# Patient Record
Sex: FEMALE | Race: WHITE | NOT HISPANIC OR LATINO | Employment: FULL TIME | URBAN - METROPOLITAN AREA
[De-identification: names, ages, dates, MRNs, and addresses within clinical notes are randomized per-mention and may not be internally consistent; named-entity substitution may affect disease eponyms.]

---

## 2017-01-02 DIAGNOSIS — N95.1 MENOPAUSAL SYNDROME: ICD-10-CM

## 2017-01-03 RX ORDER — SUMATRIPTAN SUCCINATE 100 MG/1
TABLET ORAL
Qty: 6 TABLET | Refills: 0 | OUTPATIENT
Start: 2017-01-03

## 2017-01-05 ENCOUNTER — PATIENT MESSAGE (OUTPATIENT)
Dept: OBSTETRICS AND GYNECOLOGY | Facility: CLINIC | Age: 62
End: 2017-01-05

## 2017-01-05 DIAGNOSIS — N95.1 MENOPAUSAL SYNDROME: ICD-10-CM

## 2017-01-05 RX ORDER — SUMATRIPTAN SUCCINATE 100 MG/1
TABLET ORAL
Qty: 6 TABLET | Refills: 3 | Status: SHIPPED | OUTPATIENT
Start: 2017-01-05 | End: 2019-08-29

## 2017-01-07 DIAGNOSIS — N95.1 MENOPAUSAL SYNDROME: ICD-10-CM

## 2017-01-08 RX ORDER — SUMATRIPTAN SUCCINATE 100 MG/1
TABLET ORAL
Qty: 6 TABLET | Refills: 0 | Status: SHIPPED | OUTPATIENT
Start: 2017-01-08 | End: 2017-04-07 | Stop reason: SDUPTHER

## 2017-01-09 DIAGNOSIS — B35.1 ONYCHOMYCOSIS: ICD-10-CM

## 2017-01-10 RX ORDER — TERBINAFINE HYDROCHLORIDE 250 MG/1
TABLET ORAL
Qty: 30 TABLET | Refills: 0 | Status: SHIPPED | OUTPATIENT
Start: 2017-01-10 | End: 2017-02-16 | Stop reason: SDUPTHER

## 2017-01-28 DIAGNOSIS — N95.1 MENOPAUSAL SYNDROME: ICD-10-CM

## 2017-01-30 RX ORDER — SULFAMETHOXAZOLE AND TRIMETHOPRIM 800; 160 MG/1; MG/1
TABLET ORAL
Qty: 90 TABLET | Refills: 1 | Status: SHIPPED | OUTPATIENT
Start: 2017-01-30 | End: 2017-03-20 | Stop reason: SDUPTHER

## 2017-02-16 DIAGNOSIS — B35.1 ONYCHOMYCOSIS: ICD-10-CM

## 2017-02-20 RX ORDER — TERBINAFINE HYDROCHLORIDE 250 MG/1
TABLET ORAL
Qty: 30 TABLET | Refills: 0 | Status: SHIPPED | OUTPATIENT
Start: 2017-02-20 | End: 2017-10-29 | Stop reason: SDUPTHER

## 2017-03-20 DIAGNOSIS — N95.1 MENOPAUSAL SYNDROME: ICD-10-CM

## 2017-03-20 RX ORDER — THYROID 60 MG/1
60 TABLET ORAL DAILY
Qty: 90 TABLET | Refills: 1 | Status: SHIPPED | OUTPATIENT
Start: 2017-03-20 | End: 2018-04-02 | Stop reason: DRUGHIGH

## 2017-03-20 RX ORDER — ESTRADIOL 0.1 MG/D
1 FILM, EXTENDED RELEASE TRANSDERMAL
Qty: 8 PATCH | Refills: 4 | Status: SHIPPED | OUTPATIENT
Start: 2017-03-20 | End: 2018-03-20

## 2017-03-20 RX ORDER — PROGESTERONE 100 MG/1
100 CAPSULE ORAL DAILY
Qty: 90 CAPSULE | Refills: 1 | Status: SHIPPED | OUTPATIENT
Start: 2017-03-20 | End: 2018-04-02

## 2017-04-07 DIAGNOSIS — N95.1 MENOPAUSAL SYNDROME: ICD-10-CM

## 2017-04-07 RX ORDER — SUMATRIPTAN SUCCINATE 100 MG/1
TABLET ORAL
Qty: 6 TABLET | Refills: 0 | Status: SHIPPED | OUTPATIENT
Start: 2017-04-07 | End: 2017-08-10 | Stop reason: SDUPTHER

## 2017-06-28 ENCOUNTER — PATIENT MESSAGE (OUTPATIENT)
Dept: CARDIOLOGY | Facility: CLINIC | Age: 62
End: 2017-06-28

## 2017-06-28 ENCOUNTER — OFFICE VISIT (OUTPATIENT)
Dept: CARDIOLOGY | Facility: CLINIC | Age: 62
End: 2017-06-28
Payer: COMMERCIAL

## 2017-06-28 VITALS
HEIGHT: 63 IN | HEART RATE: 84 BPM | SYSTOLIC BLOOD PRESSURE: 143 MMHG | BODY MASS INDEX: 29.49 KG/M2 | WEIGHT: 166.44 LBS | DIASTOLIC BLOOD PRESSURE: 102 MMHG

## 2017-06-28 DIAGNOSIS — R00.2 PALPITATIONS: ICD-10-CM

## 2017-06-28 DIAGNOSIS — E78.5 HYPERLIPIDEMIA, UNSPECIFIED HYPERLIPIDEMIA TYPE: ICD-10-CM

## 2017-06-28 DIAGNOSIS — I34.1 MVP (MITRAL VALVE PROLAPSE): ICD-10-CM

## 2017-06-28 DIAGNOSIS — E03.9 HYPOTHYROIDISM, UNSPECIFIED TYPE: ICD-10-CM

## 2017-06-28 PROCEDURE — 93000 ELECTROCARDIOGRAM COMPLETE: CPT | Mod: S$GLB,,, | Performed by: INTERNAL MEDICINE

## 2017-06-28 PROCEDURE — 99204 OFFICE O/P NEW MOD 45 MIN: CPT | Mod: S$GLB,,, | Performed by: INTERNAL MEDICINE

## 2017-06-28 PROCEDURE — 99999 PR PBB SHADOW E&M-EST. PATIENT-LVL II: CPT | Mod: PBBFAC,,, | Performed by: INTERNAL MEDICINE

## 2017-06-28 NOTE — PROGRESS NOTES
Subjective:    Patient ID:  Fabricio Sawyer is a 62 y.o. female who presents for follow-up of Palpitations (check up); Mitral Valve Prolapse; and Hyperlipidemia      Pt with a previous diagnosis of MVP as a teen felt she should be checked. No other cardiac issues to report. She has occasional palpitations with too much caffeine and some ankle swelling with too much salt. She exercises regularly with walking 3 miles at a time w/o problem. She denies any dizziness, syncope or pre-syncope. She denies any chest pain, SOB, PND, orthopnea. She denies exertional symptoms.        Review of Systems   Constitution: Negative for weight gain and weight loss.   HENT: Negative.    Eyes: Negative.    Cardiovascular: Positive for palpitations (occasional). Negative for chest pain, claudication, cyanosis, dyspnea on exertion, irregular heartbeat, leg swelling, near-syncope, orthopnea (no PND) and syncope.   Respiratory: Negative for cough, hemoptysis, shortness of breath and snoring.    Endocrine: Negative.    Skin: Negative.    Musculoskeletal: Negative for joint pain, muscle cramps, muscle weakness and myalgias.   Gastrointestinal: Negative for diarrhea, hematemesis, nausea and vomiting.   Genitourinary: Negative.    Neurological: Negative for dizziness, focal weakness, light-headedness, loss of balance, numbness, paresthesias and seizures.   Psychiatric/Behavioral: Negative.         Objective:    Physical Exam   Constitutional: She is oriented to person, place, and time. She appears well-developed and well-nourished.   Eyes: Pupils are equal, round, and reactive to light.   Neck: Normal range of motion. No thyromegaly present.   Cardiovascular: Normal rate, regular rhythm, S1 normal, S2 normal, normal heart sounds, intact distal pulses and normal pulses.   No extrasystoles are present. PMI is not displaced.  Exam reveals no friction rub.    No murmur heard.  Pulmonary/Chest: Effort normal and breath sounds normal. She has no  wheezes. She has no rales. She exhibits no tenderness.   Abdominal: Soft. Bowel sounds are normal. She exhibits no distension and no mass. There is no tenderness.   Musculoskeletal: Normal range of motion. She exhibits no edema.   Neurological: She is alert and oriented to person, place, and time.   Skin: Skin is warm and dry.   Vitals reviewed.      Test(s) Reviewed  I have reviewed the following in detail:  [x] Stress test   [] Angiography   [x] Echocardiogram   [x] Labs   [x] Other:  ECG - NSR; PVC; O/W nml       Assessment:       1. Hyperlipidemia, unspecified hyperlipidemia type    2. Hypothyroidism, unspecified type    3. MVP (mitral valve prolapse)         Plan:       We discussed diet, continued Exercise, her lipids and weight loss  Echo  She will try diet for a few months - she did not feel well previously on statins

## 2017-06-29 DIAGNOSIS — R00.2 PALPITATIONS: Primary | ICD-10-CM

## 2017-07-13 ENCOUNTER — CLINICAL SUPPORT (OUTPATIENT)
Dept: CARDIOLOGY | Facility: CLINIC | Age: 62
End: 2017-07-13
Payer: COMMERCIAL

## 2017-07-13 DIAGNOSIS — E03.9 HYPOTHYROIDISM, UNSPECIFIED TYPE: ICD-10-CM

## 2017-07-13 DIAGNOSIS — E78.5 HYPERLIPIDEMIA, UNSPECIFIED HYPERLIPIDEMIA TYPE: ICD-10-CM

## 2017-07-13 DIAGNOSIS — I34.0 NONRHEUMATIC MITRAL VALVE INSUFFICIENCY: ICD-10-CM

## 2017-07-13 DIAGNOSIS — I34.1 MVP (MITRAL VALVE PROLAPSE): ICD-10-CM

## 2017-07-13 PROCEDURE — 93306 TTE W/DOPPLER COMPLETE: CPT | Mod: S$GLB,,, | Performed by: INTERNAL MEDICINE

## 2017-07-14 LAB
DIASTOLIC DYSFUNCTION: NO
ESTIMATED PA SYSTOLIC PRESSURE: 29.21
MITRAL VALVE MOBILITY: NORMAL
MITRAL VALVE REGURGITATION: NORMAL
RETIRED EF AND QEF - SEE NOTES: 55 (ref 55–65)
TRICUSPID VALVE REGURGITATION: NORMAL

## 2017-07-18 ENCOUNTER — TELEPHONE (OUTPATIENT)
Dept: CARDIOLOGY | Facility: CLINIC | Age: 62
End: 2017-07-18

## 2017-07-18 NOTE — TELEPHONE ENCOUNTER
Test(s) Reviewed  I have reviewed the following in detail:  [] Stress test   [] Angiography   [x] Echocardiogram   [] Labs   [] Other:       Call Pt and tell her tests are ok  Mild mitral valve leak - nothing to do  Can f/u with repeat Echo in 1 year

## 2017-07-19 ENCOUNTER — PATIENT MESSAGE (OUTPATIENT)
Dept: CARDIOLOGY | Facility: CLINIC | Age: 62
End: 2017-07-19

## 2017-07-20 ENCOUNTER — PATIENT MESSAGE (OUTPATIENT)
Dept: CARDIOLOGY | Facility: CLINIC | Age: 62
End: 2017-07-20

## 2017-08-10 DIAGNOSIS — N95.1 MENOPAUSAL SYNDROME: ICD-10-CM

## 2017-08-11 RX ORDER — SUMATRIPTAN SUCCINATE 100 MG/1
TABLET ORAL
Qty: 6 TABLET | Refills: 0 | Status: SHIPPED | OUTPATIENT
Start: 2017-08-11 | End: 2017-10-01 | Stop reason: SDUPTHER

## 2017-10-01 DIAGNOSIS — N95.1 MENOPAUSAL SYNDROME: ICD-10-CM

## 2017-10-02 RX ORDER — SUMATRIPTAN SUCCINATE 100 MG/1
TABLET ORAL
Qty: 6 TABLET | Refills: 0 | Status: SHIPPED | OUTPATIENT
Start: 2017-10-02 | End: 2017-11-28 | Stop reason: SDUPTHER

## 2017-10-28 DIAGNOSIS — B35.1 ONYCHOMYCOSIS: ICD-10-CM

## 2017-10-29 RX ORDER — TERBINAFINE HYDROCHLORIDE 250 MG/1
TABLET ORAL
Qty: 30 TABLET | Refills: 0 | Status: SHIPPED | OUTPATIENT
Start: 2017-10-29 | End: 2017-11-30 | Stop reason: SDUPTHER

## 2017-11-28 DIAGNOSIS — N95.1 MENOPAUSAL SYNDROME: ICD-10-CM

## 2017-11-28 RX ORDER — SUMATRIPTAN SUCCINATE 100 MG/1
TABLET ORAL
Qty: 6 TABLET | Refills: 0 | Status: SHIPPED | OUTPATIENT
Start: 2017-11-28 | End: 2018-01-21 | Stop reason: SDUPTHER

## 2017-11-29 ENCOUNTER — OFFICE VISIT (OUTPATIENT)
Dept: FAMILY MEDICINE | Facility: CLINIC | Age: 62
End: 2017-11-29
Payer: COMMERCIAL

## 2017-11-29 VITALS
SYSTOLIC BLOOD PRESSURE: 140 MMHG | WEIGHT: 167.56 LBS | RESPIRATION RATE: 18 BRPM | HEART RATE: 95 BPM | DIASTOLIC BLOOD PRESSURE: 100 MMHG | HEIGHT: 63 IN | BODY MASS INDEX: 29.69 KG/M2

## 2017-11-29 DIAGNOSIS — G43.809 OTHER MIGRAINE WITHOUT STATUS MIGRAINOSUS, NOT INTRACTABLE: ICD-10-CM

## 2017-11-29 DIAGNOSIS — B35.1 ONYCHOMYCOSIS: ICD-10-CM

## 2017-11-29 DIAGNOSIS — F33.1 MODERATE EPISODE OF RECURRENT MAJOR DEPRESSIVE DISORDER: Primary | ICD-10-CM

## 2017-11-29 PROCEDURE — 96372 THER/PROPH/DIAG INJ SC/IM: CPT | Mod: S$GLB,,, | Performed by: FAMILY MEDICINE

## 2017-11-29 PROCEDURE — 99214 OFFICE O/P EST MOD 30 MIN: CPT | Mod: 25,S$GLB,, | Performed by: PHYSICIAN ASSISTANT

## 2017-11-29 PROCEDURE — 99999 PR PBB SHADOW E&M-EST. PATIENT-LVL III: CPT | Mod: PBBFAC,,, | Performed by: PHYSICIAN ASSISTANT

## 2017-11-29 RX ORDER — KETOROLAC TROMETHAMINE 30 MG/ML
60 INJECTION, SOLUTION INTRAMUSCULAR; INTRAVENOUS
Status: COMPLETED | OUTPATIENT
Start: 2017-11-29 | End: 2017-11-29

## 2017-11-29 RX ORDER — BUPROPION HYDROCHLORIDE 100 MG/1
TABLET, EXTENDED RELEASE ORAL
Qty: 60 TABLET | Refills: 1 | Status: SHIPPED | OUTPATIENT
Start: 2017-11-29 | End: 2018-01-28 | Stop reason: SDUPTHER

## 2017-11-29 RX ORDER — THYROID, PORCINE 90 MG/1
120 TABLET ORAL
Refills: 0 | COMMUNITY
Start: 2017-10-01 | End: 2020-09-02 | Stop reason: CLARIF

## 2017-11-29 RX ORDER — NALTREXONE HYDROCHLORIDE AND BUPROPION HYDROCHLORIDE 8; 90 MG/1; MG/1
TABLET, EXTENDED RELEASE ORAL
Refills: 5 | COMMUNITY
Start: 2017-11-15 | End: 2018-04-02 | Stop reason: SINTOL

## 2017-11-29 RX ORDER — PROGESTERONE 200 MG/1
200 CAPSULE ORAL NIGHTLY
Refills: 1 | COMMUNITY
Start: 2017-09-23

## 2017-11-29 RX ADMIN — KETOROLAC TROMETHAMINE 60 MG: 30 INJECTION, SOLUTION INTRAMUSCULAR; INTRAVENOUS at 10:11

## 2017-11-29 NOTE — PROGRESS NOTES
Subjective:       Patient ID: Fabricio Sawyer is a 62 y.o. female    Chief Complaint: Migraines (Migraine headaches since Monday.Patient states that Contrave (been taking it for 2 weeks)might be causing symptoms.patient very emotional.Crying.Also some running nose for 6 months)    HPI  Mrs Sawyer is new to me.  She presents today CO depression, irritability, crying and migraines for the past 3 days that have kept her from working.  She was started on Contrave by her gynecologist 2 weeks ago and she has tapered up to taking one tablet in the morning and one at night.  She reports that she usually gets a migraine twice a month that resolves with Imitrex.  Her headaches have been the same as her usual headaches, she denies any increasing pain or new symptoms.  In 2012 she was prescribed Wellbutrin and tolerated it well.      Review of Systems   Constitutional: Negative for fatigue.   Cardiovascular: Negative for chest pain and palpitations.   Gastrointestinal: Negative for abdominal pain.   Musculoskeletal: Positive for myalgias.   Neurological: Positive for headaches.   Psychiatric/Behavioral: Positive for agitation and dysphoric mood. Negative for self-injury. The patient is nervous/anxious.         Objective:   Physical Exam   Constitutional: She is oriented to person, place, and time. She appears well-developed and well-nourished. No distress.   HENT:   Head: Normocephalic and atraumatic.   Eyes: EOM are normal. Pupils are equal, round, and reactive to light.   Neck: Normal range of motion. Neck supple.   Cardiovascular: Normal rate, regular rhythm, normal heart sounds and intact distal pulses.  Exam reveals no gallop and no friction rub.    No murmur heard.  Pulmonary/Chest: Effort normal and breath sounds normal. No respiratory distress. She has no wheezes. She has no rales. She exhibits no tenderness.   Abdominal: Soft. Bowel sounds are normal. She exhibits no distension and no mass. There is no tenderness.  There is no guarding.   Musculoskeletal: Normal range of motion.   Neurological: She is alert and oriented to person, place, and time.   Skin: Skin is warm and dry. No rash noted. She is not diaphoretic.   Psychiatric: Her behavior is normal. Judgment and thought content normal.   The patient is crying throughout the exam, she denies any SI or HI        Assessment:       1. Moderate episode of recurrent major depressive disorder  buPROPion (WELLBUTRIN SR) 100 MG TBSR 12 hr tablet   2. Other migraine without status migrainosus, not intractable  ketorolac injection 60 mg        Plan:       Moderate episode of recurrent major depressive disorder  -     buPROPion (WELLBUTRIN SR) 100 MG TBSR 12 hr tablet; Start with one table once daily X 1 week then increase to twice daily  Dispense: 60 tablet; Refill: 1  - wean off of Contrave per your pre scribers instructions    Other migraine without status migrainosus, not intractable  -     ketorolac injection 60 mg; Inject 2 mLs (60 mg total) into the muscle one time.

## 2017-11-29 NOTE — LETTER
November 29, 2017      [facility]  [address] [facility] [address] [place] [address]  Phone: [phone]  Fax: [phone]       Patient: Fabricio Sawyer   YOB: 1955  Date of Visit: 11/29/2017    To Whom It May Concern:    Jovanny Sawyer  was at [facility] on 11/29/2017. She may return to work/school on 12/04/2017 with no restrictions. If you have any questions or concerns, or if I can be of further assistance, please do not hesitate to contact me.    Sincerely,    Tania Beltran PA-C

## 2017-11-30 RX ORDER — TERBINAFINE HYDROCHLORIDE 250 MG/1
TABLET ORAL
Qty: 30 TABLET | Refills: 0 | Status: SHIPPED | OUTPATIENT
Start: 2017-11-30 | End: 2018-01-06 | Stop reason: SDUPTHER

## 2018-01-06 DIAGNOSIS — B35.1 ONYCHOMYCOSIS: ICD-10-CM

## 2018-01-08 RX ORDER — TERBINAFINE HYDROCHLORIDE 250 MG/1
TABLET ORAL
Qty: 30 TABLET | Refills: 0 | Status: SHIPPED | OUTPATIENT
Start: 2018-01-08 | End: 2018-02-11 | Stop reason: SDUPTHER

## 2018-01-21 DIAGNOSIS — N95.1 MENOPAUSAL SYNDROME: ICD-10-CM

## 2018-01-21 RX ORDER — SUMATRIPTAN SUCCINATE 100 MG/1
TABLET ORAL
Qty: 12 TABLET | Refills: 3 | Status: SHIPPED | OUTPATIENT
Start: 2018-01-21 | End: 2018-02-20

## 2018-01-28 DIAGNOSIS — F33.1 MODERATE EPISODE OF RECURRENT MAJOR DEPRESSIVE DISORDER: ICD-10-CM

## 2018-01-29 RX ORDER — BUPROPION HYDROCHLORIDE 100 MG/1
TABLET, EXTENDED RELEASE ORAL
Qty: 60 TABLET | Refills: 1 | Status: SHIPPED | OUTPATIENT
Start: 2018-01-29 | End: 2018-03-14 | Stop reason: SDUPTHER

## 2018-01-29 RX ORDER — ESTRADIOL 0.07 MG/D
1 FILM, EXTENDED RELEASE TRANSDERMAL
COMMUNITY
Start: 2017-12-06

## 2018-02-03 ENCOUNTER — PATIENT MESSAGE (OUTPATIENT)
Dept: PODIATRY | Facility: CLINIC | Age: 63
End: 2018-02-03

## 2018-02-10 DIAGNOSIS — B35.1 ONYCHOMYCOSIS: ICD-10-CM

## 2018-02-11 RX ORDER — TERBINAFINE HYDROCHLORIDE 250 MG/1
TABLET ORAL
Qty: 30 TABLET | Refills: 0 | Status: SHIPPED | OUTPATIENT
Start: 2018-02-11 | End: 2018-03-13 | Stop reason: SDUPTHER

## 2018-03-05 ENCOUNTER — TELEPHONE (OUTPATIENT)
Dept: FAMILY MEDICINE | Facility: CLINIC | Age: 63
End: 2018-03-05

## 2018-03-05 NOTE — TELEPHONE ENCOUNTER
----- Message from Samantha Quiñones sent at 3/5/2018  4:23 PM CST -----  Contact: Patient   X _1st Request  _  2nd Request  _  3rd Request    Who:BEBA DELGADO [[id number]]    Why:Patient is requesting to schedule an appointment for after 3:00pm for depression     What Number to Call Back:[phone]    When to Expect a call back: (Before the end of the day)   -- if call after 3:00 call back will be tomorrow.

## 2018-03-13 DIAGNOSIS — B35.1 ONYCHOMYCOSIS: ICD-10-CM

## 2018-03-13 RX ORDER — TERBINAFINE HYDROCHLORIDE 250 MG/1
TABLET ORAL
Qty: 30 TABLET | Refills: 0 | Status: SHIPPED | OUTPATIENT
Start: 2018-03-13 | End: 2018-04-08 | Stop reason: SDUPTHER

## 2018-03-14 DIAGNOSIS — F33.1 MODERATE EPISODE OF RECURRENT MAJOR DEPRESSIVE DISORDER: ICD-10-CM

## 2018-03-16 RX ORDER — BUPROPION HYDROCHLORIDE 100 MG/1
100 TABLET, EXTENDED RELEASE ORAL 2 TIMES DAILY
Qty: 60 TABLET | Refills: 5 | Status: SHIPPED | OUTPATIENT
Start: 2018-03-16 | End: 2018-04-02

## 2018-04-02 ENCOUNTER — OFFICE VISIT (OUTPATIENT)
Dept: FAMILY MEDICINE | Facility: CLINIC | Age: 63
End: 2018-04-02
Payer: COMMERCIAL

## 2018-04-02 VITALS
SYSTOLIC BLOOD PRESSURE: 124 MMHG | TEMPERATURE: 99 F | WEIGHT: 168.31 LBS | BODY MASS INDEX: 29.82 KG/M2 | DIASTOLIC BLOOD PRESSURE: 82 MMHG | OXYGEN SATURATION: 98 % | RESPIRATION RATE: 18 BRPM | HEIGHT: 63 IN | HEART RATE: 89 BPM

## 2018-04-02 DIAGNOSIS — F32.A DEPRESSION, UNSPECIFIED DEPRESSION TYPE: ICD-10-CM

## 2018-04-02 DIAGNOSIS — M54.2 CERVICALGIA: Primary | ICD-10-CM

## 2018-04-02 PROCEDURE — 99999 PR PBB SHADOW E&M-EST. PATIENT-LVL III: CPT | Mod: PBBFAC,,, | Performed by: FAMILY MEDICINE

## 2018-04-02 PROCEDURE — 99214 OFFICE O/P EST MOD 30 MIN: CPT | Mod: S$GLB,,, | Performed by: FAMILY MEDICINE

## 2018-04-02 RX ORDER — TIZANIDINE 2 MG/1
TABLET ORAL
Qty: 30 TABLET | Refills: 1 | Status: SHIPPED | OUTPATIENT
Start: 2018-04-02 | End: 2018-05-12 | Stop reason: SDUPTHER

## 2018-04-02 RX ORDER — BUPROPION HYDROCHLORIDE 300 MG/1
300 TABLET ORAL DAILY
Qty: 30 TABLET | Refills: 5 | Status: SHIPPED | OUTPATIENT
Start: 2018-04-02 | End: 2018-09-27 | Stop reason: SDUPTHER

## 2018-04-02 NOTE — PROGRESS NOTES
Subjective:     THIS DOCUMENT WAS MADE IN PART WITH [employer] DICTATION SOFTWARE. OCCASIONALLY THIS SOFTWARE MAY MISINTERPRET WORDS OR PHRASES.     Patient ID: Fabricio Sawyer is a 63 y.o. female.    Chief Complaint: Medication Problem (would like to discuss medication change of anti depressant)    HPI      Patient presents for medication follow-up and requested adjustments. I last saw her personally in 2014. She has a history of depression and anxiety and stress. Recently was placed on Wellbutrin. She said she recognize warning signs of depression, becoming isolated, anhedonia. Two nightmares but no SI. She is improved after beginning will be true but still not ideal.   When I last saw her four years ago she had similar response to will be different but not complete and was placed on Lexapro. This continuing with the Wellbutrin. She was lost to follow-up and she reports that she did well and after about a year, under guidance from her office she reports she queen down on both and did fine until recently.     She does complain of stress which causes muscle tightness  Tight in lower neck and upper back  Chiropractor, , marginal improvement.    Does take a sleep aid otc daily    Active Ambulatory Problems     Diagnosis Date Noted    Hyperlipidemia     Greater trochanteric bursitis 02/13/2012    Incontinence of urine- mild 03/26/2013    Hypothyroid 03/24/2014    Anxiety 09/12/2014    MVP (mitral valve prolapse) 06/28/2017     Resolved Ambulatory Problems     Diagnosis Date Noted    No Resolved Ambulatory Problems     Past Medical History:   Diagnosis Date    Abnormal Pap smear     Abnormal Pap smear of vagina     Breast disorder     Colon polyps     Hyperlipidemia     Hypothyroid 3/24/2014    MVP (mitral valve prolapse)          Review of Systems   Respiratory: Negative for chest tightness and shortness of breath.    Cardiovascular: Negative for chest pain.   Psychiatric/Behavioral: Positive for dysphoric  "mood and sleep disturbance. Negative for suicidal ideas. The patient is nervous/anxious.       As above  Objective:      Physical Exam    Vitals:    04/02/18 1625   BP: 124/82   BP Location: Left arm   Patient Position: Sitting   BP Method: Medium (Manual)   Pulse: 89   Resp: 18   Temp: 98.9 °F (37.2 °C)   TempSrc: Oral   SpO2: 98%   Weight: 76.4 kg (168 lb 5.1 oz)   Height: 5' 3" (1.6 m)         Assessment:       1. Cervicalgia    2. Depression, unspecified depression type        Plan:       Fabricio was seen today for medication problem.    Diagnoses and all orders for this visit:    Cervicalgia   she has some muscle tightness in the cervical, trapezius, and upper thoracic region. She feels it is exacerbated by stress. I will add a muscle relaxer QHS, is not improving consider therapy    Depression, unspecified depression type   she appears improving without any alarming or warning signs. But not ideal. Discontinue Wellbutrin  BID, begin Wellbutrin  every morning. After a few more weeks if not steadily improving could consider adding back an SSRI. But she has had a positive response to Wellbutrin and I think this will continue to improve at the 300 mg. Also she does mention that a few times a week she often forgets one of the BID doses of the Wellbutrin so increasing and moving to daily dosing should improve compliance and response  Other orders  -     buPROPion (WELLBUTRIN XL) 300 MG 24 hr tablet; Take 1 tablet (300 mg total) by mouth once daily.  -     tiZANidine (ZANAFLEX) 2 MG tablet; Take 1-2 po QHS prn muscle spasm     her thyroid disorder is managed by Karina Hamilton    Appointment duration greater than 25 min., more than 50% face-to-face counseling         "

## 2018-04-05 DIAGNOSIS — Z12.11 COLON CANCER SCREENING: ICD-10-CM

## 2018-04-08 DIAGNOSIS — B35.1 ONYCHOMYCOSIS: ICD-10-CM

## 2018-04-08 RX ORDER — TERBINAFINE HYDROCHLORIDE 250 MG/1
TABLET ORAL
Qty: 30 TABLET | Refills: 0 | Status: SHIPPED | OUTPATIENT
Start: 2018-04-08 | End: 2018-05-21 | Stop reason: SDUPTHER

## 2018-05-14 RX ORDER — TIZANIDINE 2 MG/1
TABLET ORAL
Qty: 30 TABLET | Refills: 1 | Status: SHIPPED | OUTPATIENT
Start: 2018-05-14 | End: 2018-11-08 | Stop reason: SDUPTHER

## 2018-05-21 DIAGNOSIS — B35.1 ONYCHOMYCOSIS: ICD-10-CM

## 2018-05-25 RX ORDER — TERBINAFINE HYDROCHLORIDE 250 MG/1
TABLET ORAL
Qty: 30 TABLET | Refills: 0 | Status: SHIPPED | OUTPATIENT
Start: 2018-05-25 | End: 2018-08-14

## 2018-08-14 ENCOUNTER — OFFICE VISIT (OUTPATIENT)
Dept: FAMILY MEDICINE | Facility: CLINIC | Age: 63
End: 2018-08-14
Payer: COMMERCIAL

## 2018-08-14 VITALS
SYSTOLIC BLOOD PRESSURE: 116 MMHG | WEIGHT: 152.13 LBS | OXYGEN SATURATION: 97 % | RESPIRATION RATE: 16 BRPM | HEART RATE: 92 BPM | HEIGHT: 63 IN | DIASTOLIC BLOOD PRESSURE: 76 MMHG | BODY MASS INDEX: 26.95 KG/M2 | TEMPERATURE: 98 F

## 2018-08-14 DIAGNOSIS — J01.00 ACUTE NON-RECURRENT MAXILLARY SINUSITIS: Primary | ICD-10-CM

## 2018-08-14 PROCEDURE — 3008F BODY MASS INDEX DOCD: CPT | Mod: CPTII,S$GLB,, | Performed by: NURSE PRACTITIONER

## 2018-08-14 PROCEDURE — 96372 THER/PROPH/DIAG INJ SC/IM: CPT | Mod: S$GLB,,, | Performed by: NURSE PRACTITIONER

## 2018-08-14 PROCEDURE — 99214 OFFICE O/P EST MOD 30 MIN: CPT | Mod: 25,S$GLB,, | Performed by: NURSE PRACTITIONER

## 2018-08-14 PROCEDURE — 99999 PR PBB SHADOW E&M-EST. PATIENT-LVL III: CPT | Mod: PBBFAC,,, | Performed by: NURSE PRACTITIONER

## 2018-08-14 RX ORDER — DOXYCYCLINE HYCLATE 100 MG
100 TABLET ORAL EVERY 12 HOURS
Qty: 14 TABLET | Refills: 0 | Status: SHIPPED | OUTPATIENT
Start: 2018-08-14 | End: 2018-08-21

## 2018-08-14 RX ORDER — BETAMETHASONE SODIUM PHOSPHATE AND BETAMETHASONE ACETATE 3; 3 MG/ML; MG/ML
6 INJECTION, SUSPENSION INTRA-ARTICULAR; INTRALESIONAL; INTRAMUSCULAR; SOFT TISSUE
Status: COMPLETED | OUTPATIENT
Start: 2018-08-14 | End: 2018-08-14

## 2018-08-14 RX ADMIN — BETAMETHASONE SODIUM PHOSPHATE AND BETAMETHASONE ACETATE 6 MG: 3; 3 INJECTION, SUSPENSION INTRA-ARTICULAR; INTRALESIONAL; INTRAMUSCULAR; SOFT TISSUE at 09:08

## 2018-08-14 NOTE — PROGRESS NOTES
Subjective:       Patient ID: Fabricio Sawyer is a 63 y.o. female.    Chief Complaint: Sinusitis; Nasal Congestion (clear drainage); Ear Fullness; Chest Congestion; Cough (clear sputum); and Chills    Ms. Sawyer is a new patient to me. She presents today for sinus issues     Sinusitis   This is a new problem. The current episode started 1 to 4 weeks ago. Associated symptoms include chills, congestion, coughing, ear pain, headaches and sinus pressure. Pertinent negatives include no diaphoresis or shortness of breath. (Left maxillary pain/pressure; dental pain on left side) Treatments tried: mucinex.     Vitals:    08/14/18 0857   BP: 116/76   Pulse: 92   Resp: 16   Temp: 98.3 °F (36.8 °C)     Review of Systems   Constitutional: Positive for chills. Negative for diaphoresis and fever.   HENT: Positive for congestion, ear pain, sinus pressure and sinus pain. Negative for facial swelling and trouble swallowing.    Eyes: Negative for discharge and redness.   Respiratory: Positive for cough. Negative for shortness of breath.    Cardiovascular: Negative for chest pain and palpitations.   Gastrointestinal: Negative for abdominal pain and diarrhea.   Genitourinary: Negative for difficulty urinating.   Musculoskeletal: Negative for gait problem.   Skin: Negative for rash and wound.   Neurological: Positive for headaches. Negative for facial asymmetry and speech difficulty.   Psychiatric/Behavioral: Negative for confusion. The patient is not nervous/anxious.        Past Medical History:   Diagnosis Date    Abnormal Pap smear     about 15 years ago coned and cryo normals ever since    Abnormal Pap smear of vagina     Breast disorder     implants one is slightly leaking    Colon polyps     last cscope 2010 clear    Hyperlipidemia     Hypothyroid 3/24/2014    MVP (mitral valve prolapse)      Objective:      Physical Exam   Constitutional: She is oriented to person, place, and time. She does not have a sickly appearance.  No distress.   HENT:   Head: Normocephalic.   Right Ear: Hearing, tympanic membrane, external ear and ear canal normal.   Left Ear: Hearing normal. Tympanic membrane is bulging.   Nose: Mucosal edema present. Left sinus exhibits maxillary sinus tenderness.   Mouth/Throat: No oropharyngeal exudate, posterior oropharyngeal edema or posterior oropharyngeal erythema.   +PND   Eyes: Conjunctivae and lids are normal.   Neck: No JVD present. No tracheal deviation present.   Cardiovascular: Normal rate and normal heart sounds.   Pulmonary/Chest: Effort normal and breath sounds normal.   Abdominal: Normal appearance. She exhibits no distension.   Musculoskeletal: She exhibits no deformity.   Neurological: She is alert and oriented to person, place, and time.   Skin: She is not diaphoretic. No pallor.   Psychiatric: She has a normal mood and affect. Her speech is normal and behavior is normal. Judgment and thought content normal. Cognition and memory are normal.   Nursing note and vitals reviewed.      Assessment:       1. Acute non-recurrent maxillary sinusitis        Plan:       Acute non-recurrent maxillary sinusitis  -     doxycycline (VIBRA-TABS) 100 MG tablet; Take 1 tablet (100 mg total) by mouth every 12 (twelve) hours. for 7 days  Dispense: 14 tablet; Refill: 0  -     betamethasone acetate-betamethasone sodium phosphate injection 6 mg; Inject 1 mL (6 mg total) into the muscle one time.  - Educated on supportive care/symptom relief/otc meds        Follow-up for further evaluation if s/s worsen, fail to improve, or new symptoms arise, for annual c pcp.

## 2018-09-07 DIAGNOSIS — Z12.39 BREAST CANCER SCREENING: ICD-10-CM

## 2018-09-27 RX ORDER — BUPROPION HYDROCHLORIDE 300 MG/1
300 TABLET ORAL DAILY
Qty: 30 TABLET | Refills: 5 | Status: SHIPPED | OUTPATIENT
Start: 2018-09-27 | End: 2019-04-22 | Stop reason: SDUPTHER

## 2018-10-14 ENCOUNTER — PATIENT MESSAGE (OUTPATIENT)
Dept: FAMILY MEDICINE | Facility: CLINIC | Age: 63
End: 2018-10-14

## 2018-10-15 NOTE — TELEPHONE ENCOUNTER
Pt on waiting list for sooner appt, needs to discuss possible dosage adjustment of anti-depressant medication, please see my chart message

## 2018-10-16 NOTE — TELEPHONE ENCOUNTER
Let know I reviewed her message. My schedule is limited this week. I have no openings. In fact I'm overbooked. If something opens up I can see her. Otherwise early next week.     Please advise her that I can only help or if I actually have time to help her.     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 I don't run in emergency room or a walk-in clinic. That's part of my practice with [facility].    If my schedule is full it's full and I apply the same restriction and/or availability to everybody who calls.

## 2018-11-08 RX ORDER — TIZANIDINE 2 MG/1
TABLET ORAL
Qty: 30 TABLET | Refills: 1 | Status: SHIPPED | OUTPATIENT
Start: 2018-11-08 | End: 2019-02-27

## 2018-11-08 NOTE — TELEPHONE ENCOUNTER
----- Message from Nenita Glass sent at 11/8/2018  9:53 AM CST -----  Pharmacy is requesting refill  tiZANidine (ZANAFLEX) 2 MG tablet  [facility]/pharmacy #[id number] - [place], [place] - [address] [address]  [address] [place] [address]  Phone: [phone] Fax: [phone]

## 2018-12-14 ENCOUNTER — OFFICE VISIT (OUTPATIENT)
Dept: FAMILY MEDICINE | Facility: CLINIC | Age: 63
End: 2018-12-14
Payer: COMMERCIAL

## 2018-12-14 VITALS
OXYGEN SATURATION: 97 % | HEART RATE: 88 BPM | BODY MASS INDEX: 25.82 KG/M2 | TEMPERATURE: 99 F | WEIGHT: 145.75 LBS | SYSTOLIC BLOOD PRESSURE: 118 MMHG | DIASTOLIC BLOOD PRESSURE: 70 MMHG | HEIGHT: 63 IN

## 2018-12-14 DIAGNOSIS — J01.10 ACUTE FRONTAL SINUSITIS, RECURRENCE NOT SPECIFIED: Primary | ICD-10-CM

## 2018-12-14 PROCEDURE — 99214 OFFICE O/P EST MOD 30 MIN: CPT | Mod: S$GLB,,, | Performed by: NURSE PRACTITIONER

## 2018-12-14 PROCEDURE — 99999 PR PBB SHADOW E&M-EST. PATIENT-LVL IV: CPT | Mod: PBBFAC,,, | Performed by: NURSE PRACTITIONER

## 2018-12-14 PROCEDURE — 3008F BODY MASS INDEX DOCD: CPT | Mod: CPTII,S$GLB,, | Performed by: NURSE PRACTITIONER

## 2018-12-14 RX ORDER — DOXYCYCLINE 100 MG/1
100 CAPSULE ORAL 2 TIMES DAILY
Qty: 20 CAPSULE | Refills: 0 | Status: SHIPPED | OUTPATIENT
Start: 2018-12-14 | End: 2019-02-27

## 2018-12-14 NOTE — PATIENT INSTRUCTIONS
Sudafed 30 mg for sinus pressure(sparing use)  Flonase nasal spray.   Ocean or saline nasal spray as needed.

## 2018-12-17 NOTE — PROGRESS NOTES
This dictation has been generated using Modal [employer]ency Dictation some phonetic errors may occur. Please contact author for clarification if needed.     Problem List Items Addressed This Visit     None      Visit Diagnoses     Acute frontal sinusitis, recurrence not specified    -  Primary          Orders Placed This Encounter    doxycycline (MONODOX) 100 MG capsule     Patient Instructions   Sudafed 30 mg for sinus pressure(sparing use)  Flonase nasal spray.   Ocean or saline nasal spray as needed.      Sinusitis treat with meds as above.    Follow-up if symptoms worsen or fail to improve.    ________________________________________________________________  ________________________________________________________________      Chief Complaint   Patient presents with    Sinusitis    Chest Congestion     History of present illness  This 63 y.o. presents today for complaint of sinus issues.  Symptoms started a week ago.  Notes sinus drip but yesterday relapse with facial pain. Patient notes nasal congestion and pressure.  She complains of a cough.  OTC therapies have failed  Review of systems  No fever chills or body aches  Patient denies chest pain or shortness of breath  No nausea vomiting or diarrhea  Past medical and social history reviewed.    Past Medical History:   Diagnosis Date    Abnormal Pap smear     about 15 years ago coned and cryo normals ever since    Abnormal Pap smear of vagina     Breast disorder     implants one is slightly leaking    Colon polyps     last cscope 2010 clear    Hyperlipidemia     Hypothyroid 3/24/2014    MVP (mitral valve prolapse)        Past Surgical History:   Procedure Laterality Date    APPENDECTOMY      breast implants      HYSTERECTOMY      left hand surgery      OOPHORECTOMY      TONSILLECTOMY      TUBAL LIGATION         Family History   Problem Relation Age of Onset    Cancer Father         esophageal    Hypertension Father     Cancer Mother         throat     Hypertension Brother     Hyperlipidemia Unknown     Ovarian cancer Paternal Grandmother     Breast cancer Neg Hx        Social History     Socioeconomic History    Marital status:      Spouse name: None    Number of children: None    Years of education: None    Highest education level: None   Social Needs    Financial resource strain: None    Food insecurity - worry: None    Food insecurity - inability: None    Transportation needs - medical: None    Transportation needs - non-medical: None   Occupational History    Occupation: teacher   Tobacco Use    Smoking status: Never Smoker    Smokeless tobacco: Never Used   Substance and Sexual Activity    Alcohol use: Yes     Comment: ocassionally    Drug use: No    Sexual activity: Yes     Partners: Male   Other Topics Concern    None   Social History Narrative    None       Current Outpatient Medications   Medication Sig Dispense Refill    ARMOUR THYROID 90 mg Tab TAKE 1 TABLET (90 MG) BY MOUTH DAILY ON AN EMPTY STOMACH  0    benzphetamine 50 mg Tab TK 1 T PO DAILY  0    buPROPion (WELLBUTRIN XL) 300 MG 24 hr tablet TAKE 1 TABLET (300 MG TOTAL) BY MOUTH ONCE DAILY. 30 tablet 5    estradiol (VIVELLE-DOT) 0.075 mg/24 hr Place 1 patch onto the skin once a week.       progesterone (PROMETRIUM) 200 MG capsule Take 200 mg by mouth nightly.  1    sumatriptan (IMITREX) 100 MG tablet TAKE 1 TABLET BY MOUTH EVERY 2 HOURS AS NEEDED FOR MIGRAINE - MAX 2 DOSES IN 24 HOURS 6 tablet 3    doxycycline (MONODOX) 100 MG capsule Take 1 capsule (100 mg total) by mouth 2 (two) times daily. 20 capsule 0    tiZANidine (ZANAFLEX) 2 MG tablet TAKE 1-2 TABLETS BY MOUTH AT BEDTIME AS NEEDED FOR MUSCLE SPASM 30 tablet 1     No current facility-administered medications for this visit.        Review of patient's allergies indicates:   Allergen Reactions    Codeine      Does not know    Fish containing products Itching     Itching in mouth & throat     Penicillins Swelling       Physical examination  Vitals Reviewed  Gen. Well-dressed well-nourished   Skin warm dry and intact.  No rashes noted.  HEENT.  TM intact bilateral with normal light reflex.  No mastoid tenderness during percussion.  Nares patent bilateral.  Pharynx is unremarkable.    maxillary or frontal sinus tenderness when percussed.    Neck is supple without adenopathy  Chest.  Respirations are even unlabored.  Lungs are clear to auscultation.  Cardiac regular rate and rhythm.  No chest wall adenopathy noted.  Neuro. Awake alert oriented x4.  Normal judgment and cognition noted.  Extremities no clubbing cyanosis or edema noted.     Call or return to clinic prn if these symptoms worsen or fail to improve as anticipated.

## 2019-01-14 ENCOUNTER — TELEPHONE (OUTPATIENT)
Dept: OBSTETRICS AND GYNECOLOGY | Facility: CLINIC | Age: 64
End: 2019-01-14

## 2019-01-14 ENCOUNTER — TELEPHONE (OUTPATIENT)
Dept: FAMILY MEDICINE | Facility: CLINIC | Age: 64
End: 2019-01-14

## 2019-01-14 NOTE — TELEPHONE ENCOUNTER
----- Message from Raoul Copeland sent at 1/12/2019 12:17 PM CST -----  Contact: Pt  Pt would like to be called back regarding needing orders in for Mammogram. Pt has  Discovered lump on breast.    Pt can be reached at [phone].    Thank  You.

## 2019-01-14 NOTE — TELEPHONE ENCOUNTER
Just FYI  Attempted to contact pt to schedule a mammogram that has been ordered. No answer, will try pt back later. Pt has discovered a lump on her breast.

## 2019-01-14 NOTE — TELEPHONE ENCOUNTER
If she has noticed a lump she will need a diagnostic mammogram of the affected side in addition to the routine bilateral screening mammogram.  So please when you speak to her find out which side she has noticed this.

## 2019-01-14 NOTE — TELEPHONE ENCOUNTER
----- Message from Chelsea Joseph sent at 1/14/2019  4:42 PM CST -----  Type: Needs Medical Advice    Who Called: Zully Turcios, with [employer]  [facility] Call Back Number: [phone]  Additional Information:Please contact regarding patient mammogram orders, patient is scheduled for a diagnostic mammogram and needs the order faxed to [phone]    Thank you

## 2019-01-14 NOTE — TELEPHONE ENCOUNTER
Spoke with pt who will be contacting [place] to have mammogram done there. Pt has discovered lump on breast. I offered to schedule with [facility] but pt would prefer to schedule with [place]. Advised once scheduled we will fax orders.    Called pt back to make sure she was able to get appointment with [place]. Left voicemail.

## 2019-01-14 NOTE — TELEPHONE ENCOUNTER
----- Message from Yris Red LPN sent at 1/14/2019  1:20 PM CST -----  Contact: Pt   We did not call patient , think she may be returning Dr. Brown call.    Yris  ----- Message -----  From: Cheli Dougherty  Sent: 1/14/2019  12:02 PM  To: Flo MELENDEZ Staff    Type: Needs Medical Advice    Who Called:  Pt  Best Call Back Number: [phone] (home)   Additional Information: Pt missed a call would like to be called after 3:30      Full range of motion of upper and lower extremities, no joint tenderness/swelling.

## 2019-01-14 NOTE — TELEPHONE ENCOUNTER
----- Message from Sandoval Jay sent at 1/14/2019  3:46 PM CST -----  Type: Needs Medical Advice    Who Called:  Patient    Best Call Back Number: [phone]   Additional Information: Caller states that she would like a callback regarding a lump in her left breast    Advised order is in for dx mammogram can scheduled

## 2019-01-15 ENCOUNTER — TELEPHONE (OUTPATIENT)
Dept: FAMILY MEDICINE | Facility: CLINIC | Age: 64
End: 2019-01-15

## 2019-01-15 NOTE — TELEPHONE ENCOUNTER
----- Message from Isabel Nolan sent at 1/15/2019 10:48 AM CST -----  Contact: [facility]   Call placed to Pod    [facility] need to speak nurse regarding need a copy of order for patient to received mammogram      Please fax to [phone] chandrakant Larkin [phone] phone

## 2019-01-15 NOTE — TELEPHONE ENCOUNTER
Please write on a prescription pad. Please write an order for a bilateral screening mammogram as well as a diagnostic mammogram, specify whichever side is affected for the diagnostic, I still haven't been told which side the lump is on. I will sign first thing in the morning.

## 2019-01-15 NOTE — TELEPHONE ENCOUNTER
Bilateral Screening Mammogram with diagnostic mammo of left breast written and placed on Dr. Brown's desk to be signed and faxed

## 2019-01-15 NOTE — TELEPHONE ENCOUNTER
----- Message from Teri Car sent at 1/15/2019 11:02 AM CST -----  Contact: aHrini  Type: Needs Medical Advice    Who Called: Patient  Best Call Back Number:   Additional Information: Mrs. Regalado is stating that her orders for her mammogram was not faxed to [facility] and needs to be. Please call back and advise.

## 2019-01-15 NOTE — TELEPHONE ENCOUNTER
----- Message from Nenita Glass sent at 1/15/2019  9:01 AM CST -----  Fax [phone] /  [phone] [email or web address] is scheduled for a diagnostic mammogram /  [facility] at 2 pm today  please fax the orders

## 2019-01-15 NOTE — TELEPHONE ENCOUNTER
----- Message from Chelsea Kiran sent at 1/15/2019  9:07 AM CST -----  Type: Needs Diagnosis code    Who Called:  [facility]  Best Call Back Number: [phone]  Additional Information: Stated just received order for mammogram on patient but no diagnosis code/please refax or call back if any questions.

## 2019-01-28 ENCOUNTER — LAB VISIT (OUTPATIENT)
Dept: LAB | Facility: HOSPITAL | Age: 64
End: 2019-01-28
Attending: FAMILY MEDICINE
Payer: COMMERCIAL

## 2019-01-28 DIAGNOSIS — Z12.11 COLON CANCER SCREENING: ICD-10-CM

## 2019-01-28 PROCEDURE — 82274 ASSAY TEST FOR BLOOD FECAL: CPT

## 2019-01-30 LAB — HEMOCCULT STL QL IA: NEGATIVE

## 2019-02-27 ENCOUNTER — OFFICE VISIT (OUTPATIENT)
Dept: FAMILY MEDICINE | Facility: CLINIC | Age: 64
End: 2019-02-27
Payer: COMMERCIAL

## 2019-02-27 VITALS
WEIGHT: 148.94 LBS | TEMPERATURE: 99 F | SYSTOLIC BLOOD PRESSURE: 124 MMHG | BODY MASS INDEX: 26.39 KG/M2 | DIASTOLIC BLOOD PRESSURE: 68 MMHG | HEART RATE: 110 BPM | HEIGHT: 63 IN

## 2019-02-27 DIAGNOSIS — J18.9 PNEUMONIA DUE TO INFECTIOUS ORGANISM, UNSPECIFIED LATERALITY, UNSPECIFIED PART OF LUNG: Primary | ICD-10-CM

## 2019-02-27 PROCEDURE — 3008F BODY MASS INDEX DOCD: CPT | Mod: CPTII,S$GLB,, | Performed by: NURSE PRACTITIONER

## 2019-02-27 PROCEDURE — 3008F PR BODY MASS INDEX (BMI) DOCUMENTED: ICD-10-PCS | Mod: CPTII,S$GLB,, | Performed by: NURSE PRACTITIONER

## 2019-02-27 PROCEDURE — 99214 PR OFFICE/OUTPT VISIT, EST, LEVL IV, 30-39 MIN: ICD-10-PCS | Mod: S$GLB,,, | Performed by: NURSE PRACTITIONER

## 2019-02-27 PROCEDURE — 99214 OFFICE O/P EST MOD 30 MIN: CPT | Mod: S$GLB,,, | Performed by: NURSE PRACTITIONER

## 2019-02-27 PROCEDURE — 99999 PR PBB SHADOW E&M-EST. PATIENT-LVL IV: CPT | Mod: PBBFAC,,, | Performed by: NURSE PRACTITIONER

## 2019-02-27 PROCEDURE — 99999 PR PBB SHADOW E&M-EST. PATIENT-LVL IV: ICD-10-PCS | Mod: PBBFAC,,, | Performed by: NURSE PRACTITIONER

## 2019-02-27 RX ORDER — DOXYCYCLINE 100 MG/1
100 CAPSULE ORAL 2 TIMES DAILY
Qty: 20 CAPSULE | Refills: 0 | Status: SHIPPED | OUTPATIENT
Start: 2019-02-27 | End: 2019-05-16

## 2019-02-27 NOTE — PROGRESS NOTES
This dictation has been generated using Modal Fluency Dictation some phonetic errors may occur. Please contact author for clarification if needed.     Problem List Items Addressed This Visit     None      Visit Diagnoses     Pneumonia due to infectious organism, unspecified laterality, unspecified part of lung    -  Primary          Orders Placed This Encounter    doxycycline (MONODOX) 100 MG capsule     Patient Instructions   Claritin(loratadine), Allegra(fexofenadine), or zyrtec(cetirizine) for runny nose, post nasal drip and congestion. Take in the pm.   Mucinex (Guaifenesin) daily in the morning.       Clinically developing pneumonia with productive cough, low grade fever, and slightly tachy. rx as above.    Follow-up if symptoms worsen or fail to improve.    ________________________________________________________________  ________________________________________________________________      Chief Complaint   Patient presents with    Cough     Cough, slightly prod. Started yest. HA this morning, neck pressure.     History of present illness  This 63 y.o. presents today for complaint of cough, headache, fevers.  Symptoms started last night.  Patient reports a history of pneumonia.  She denies any chills or body aches.  Patient did take any medication for symptoms.  Review of systems  Weight is stable.  No chest pain shortness of breath  No nausea vomiting or diarrhea  No rash urticaria  No abnormal bruising or bleeding  Denies confusion    Past medical and social history reviewed.  Patient is new to me.  Follows with in the clinic.    Past Medical History:   Diagnosis Date    Abnormal Pap smear     about 15 years ago coned and cryo normals ever since    Abnormal Pap smear of vagina     Breast disorder     implants one is slightly leaking    Colon polyps     last cscope 2010 clear    Hyperlipidemia     Hypothyroid 3/24/2014    MVP (mitral valve prolapse)        Past Surgical History:   Procedure  Laterality Date    APPENDECTOMY      breast implants      HYSTERECTOMY      left hand surgery      OOPHORECTOMY      TONSILLECTOMY      TUBAL LIGATION         Family History   Problem Relation Age of Onset    Cancer Father         esophageal    Hypertension Father     Cancer Mother         throat    Hypertension Brother     Hyperlipidemia Unknown     Ovarian cancer Paternal Grandmother     Breast cancer Neg Hx        Social History     Socioeconomic History    Marital status:      Spouse name: None    Number of children: None    Years of education: None    Highest education level: None   Social Needs    Financial resource strain: None    Food insecurity - worry: None    Food insecurity - inability: None    Transportation needs - medical: None    Transportation needs - non-medical: None   Occupational History    Occupation: teacher   Tobacco Use    Smoking status: Never Smoker    Smokeless tobacco: Never Used   Substance and Sexual Activity    Alcohol use: Yes     Comment: ocassionally    Drug use: No    Sexual activity: Yes     Partners: Male   Other Topics Concern    None   Social History Narrative    None       Current Outpatient Medications   Medication Sig Dispense Refill    ARMOUR THYROID 90 mg Tab TAKE 1 TABLET (90 MG) BY MOUTH DAILY ON AN EMPTY STOMACH  0    sumatriptan (IMITREX) 100 MG tablet TAKE 1 TABLET BY MOUTH EVERY 2 HOURS AS NEEDED FOR MIGRAINE - MAX 2 DOSES IN 24 HOURS 6 tablet 3    benzphetamine 50 mg Tab TK 1 T PO DAILY  0    buPROPion (WELLBUTRIN XL) 300 MG 24 hr tablet TAKE 1 TABLET (300 MG TOTAL) BY MOUTH ONCE DAILY. 30 tablet 5    doxycycline (MONODOX) 100 MG capsule Take 1 capsule (100 mg total) by mouth 2 (two) times daily. 20 capsule 0    estradiol (VIVELLE-DOT) 0.075 mg/24 hr Place 1 patch onto the skin once a week.       progesterone (PROMETRIUM) 200 MG capsule Take 200 mg by mouth nightly.  1     No current facility-administered medications  for this visit.        Review of patient's allergies indicates:   Allergen Reactions    Codeine      Does not know    Fish containing products Itching     Itching in mouth & throat    Penicillins Swelling       Physical examination  Vitals Reviewed  Gen. Well-dressed well-nourished   Skin warm dry and intact.  No rashes noted.  HEENT.  TM intact bilateral with normal light reflex.  No mastoid tenderness during percussion.  Nares patent bilateral.  Pharynx is unremarkable.  No maxillary or frontal sinus tenderness when percussed.    Neck is supple without adenopathy  Chest.  Respirations are even unlabored.  Scattered crackles in the bases noted. Wheezing noted.  Cardiac regular rate and rhythm.  No chest wall adenopathy noted.  Neuro. Awake alert oriented x4.  Normal judgment and cognition noted.  Extremities no clubbing cyanosis or edema noted.     Call or return to clinic prn if these symptoms worsen or fail to improve as anticipated.

## 2019-02-27 NOTE — PATIENT INSTRUCTIONS
Claritin(loratadine), Allegra(fexofenadine), or zyrtec(cetirizine) for runny nose, post nasal drip and congestion. Take in the pm.   Mucinex (Guaifenesin) daily in the morning.

## 2019-04-22 RX ORDER — BUPROPION HYDROCHLORIDE 300 MG/1
300 TABLET ORAL DAILY
Qty: 30 TABLET | Refills: 0 | Status: SHIPPED | OUTPATIENT
Start: 2019-04-22 | End: 2019-06-03 | Stop reason: DRUGHIGH

## 2019-05-16 ENCOUNTER — OFFICE VISIT (OUTPATIENT)
Dept: FAMILY MEDICINE | Facility: CLINIC | Age: 64
End: 2019-05-16
Payer: COMMERCIAL

## 2019-05-16 VITALS
DIASTOLIC BLOOD PRESSURE: 70 MMHG | WEIGHT: 149.81 LBS | BODY MASS INDEX: 26.54 KG/M2 | OXYGEN SATURATION: 97 % | TEMPERATURE: 99 F | HEART RATE: 87 BPM | SYSTOLIC BLOOD PRESSURE: 136 MMHG | HEIGHT: 63 IN

## 2019-05-16 DIAGNOSIS — J01.01 ACUTE RECURRENT MAXILLARY SINUSITIS: Primary | ICD-10-CM

## 2019-05-16 PROCEDURE — 99999 PR PBB SHADOW E&M-EST. PATIENT-LVL IV: CPT | Mod: PBBFAC,,, | Performed by: FAMILY MEDICINE

## 2019-05-16 PROCEDURE — 99213 PR OFFICE/OUTPT VISIT, EST, LEVL III, 20-29 MIN: ICD-10-PCS | Mod: S$GLB,,, | Performed by: FAMILY MEDICINE

## 2019-05-16 PROCEDURE — 3008F BODY MASS INDEX DOCD: CPT | Mod: CPTII,S$GLB,, | Performed by: FAMILY MEDICINE

## 2019-05-16 PROCEDURE — 99999 PR PBB SHADOW E&M-EST. PATIENT-LVL IV: ICD-10-PCS | Mod: PBBFAC,,, | Performed by: FAMILY MEDICINE

## 2019-05-16 PROCEDURE — 99213 OFFICE O/P EST LOW 20 MIN: CPT | Mod: S$GLB,,, | Performed by: FAMILY MEDICINE

## 2019-05-16 PROCEDURE — 3008F PR BODY MASS INDEX (BMI) DOCUMENTED: ICD-10-PCS | Mod: CPTII,S$GLB,, | Performed by: FAMILY MEDICINE

## 2019-05-16 RX ORDER — PREDNISONE 20 MG/1
TABLET ORAL
Qty: 10 TABLET | Refills: 0 | Status: SHIPPED | OUTPATIENT
Start: 2019-05-16 | End: 2019-05-30

## 2019-05-16 RX ORDER — LEVOFLOXACIN 750 MG/1
750 TABLET ORAL DAILY
Qty: 5 TABLET | Refills: 0 | Status: SHIPPED | OUTPATIENT
Start: 2019-05-16 | End: 2019-05-30

## 2019-05-16 NOTE — PATIENT INSTRUCTIONS
Over The Counter Medications :   Flonase : two sprays per nostril once daily  Xyzal : take one tablet nightly  Sudafed : take as directed for up to one week

## 2019-05-16 NOTE — PROGRESS NOTES
Assessment and Plan:      1. Acute recurrent maxillary sinusitis  - Antibiotic with parameters  Over The Counter Medications :   Flonase : two sprays per nostril once daily  Xyzal : take one tablet nightly  Sudafed : take as directed for up to one week  - predniSONE (DELTASONE) 20 MG tablet; Take 2 tablets by mouth daily  Dispense: 10 tablet; Refill: 0  - levoFLOXacin (LEVAQUIN) 750 MG tablet; Take 1 tablet (750 mg total) by mouth once daily.  Dispense: 5 tablet; Refill: 0      ______________________________________________________________________  Subjective:    Chief Complaint:  Chief Complaint   Patient presents with    Sinusitis        HPI:  Fabricio is a 64 y.o. year old     URI SYMPTOMS  Complains of months of post nasal drip  Recently Worse  Has been taking mucinex  Having congestion, left sided sinus pressure      Medications:  Current Outpatient Medications on File Prior to Visit   Medication Sig Dispense Refill    ARMOUR THYROID 90 mg Tab TAKE 1 TABLET (90 MG) BY MOUTH DAILY ON AN EMPTY STOMACH  0    benzphetamine 50 mg Tab TK 1 T PO DAILY  0    buPROPion (WELLBUTRIN XL) 300 MG 24 hr tablet TAKE 1 TABLET (300 MG TOTAL) BY MOUTH ONCE DAILY. 30 tablet 0    estradiol (VIVELLE-DOT) 0.075 mg/24 hr Place 1 patch onto the skin once a week.       progesterone (PROMETRIUM) 200 MG capsule Take 200 mg by mouth nightly.  1    sumatriptan (IMITREX) 100 MG tablet TAKE 1 TABLET BY MOUTH EVERY 2 HOURS AS NEEDED FOR MIGRAINE - MAX 2 DOSES IN 24 HOURS 6 tablet 3    [DISCONTINUED] doxycycline (MONODOX) 100 MG capsule Take 1 capsule (100 mg total) by mouth 2 (two) times daily. 20 capsule 0     No current facility-administered medications on file prior to visit.        Review of Systems:  Review of Systems   Constitutional: Negative for fever.   HENT: Positive for congestion and sinus pressure.    Respiratory: Negative for cough and shortness of breath.    Cardiovascular: Negative for chest pain.   Gastrointestinal:  "Negative for abdominal pain, diarrhea, nausea and vomiting.   Skin: Negative for rash.   Psychiatric/Behavioral: Negative for dysphoric mood.       Past Medical History:  Past Medical History:   Diagnosis Date    Abnormal Pap smear     about 15 years ago coned and cryo normals ever since    Abnormal Pap smear of vagina     Breast disorder     implants one is slightly leaking    Colon polyps     last cscope 2010 clear    Hyperlipidemia     Hypothyroid 3/24/2014    MVP (mitral valve prolapse)        Objective:    Vitals:  Vitals:    05/16/19 0824   BP: 136/70   Pulse: 87   Temp: 98.8 °F (37.1 °C)   SpO2: 97%   Weight: 67.9 kg (149 lb 12.8 oz)   Height: 5' 3" (1.6 m)   PainSc:   3       Physical Exam   Constitutional: No distress.   HENT:   Head: Normocephalic and atraumatic.   Nose: Mucosal edema and rhinorrhea present.   Eyes: Pupils are equal, round, and reactive to light. EOM are normal.   Neck: Neck supple.   Cardiovascular: Normal rate and regular rhythm. Exam reveals no friction rub.   No murmur heard.  Pulmonary/Chest: Effort normal and breath sounds normal.   Abdominal: Soft. Bowel sounds are normal. She exhibits no distension. There is no tenderness.   Skin: Skin is warm and dry. No rash noted.   Psychiatric: She has a normal mood and affect. Her behavior is normal.       Data:  No previous labs, imaging, or notes available.      Jakub Esquivel MD  Family Medicine  "

## 2019-05-30 ENCOUNTER — OFFICE VISIT (OUTPATIENT)
Dept: OTOLARYNGOLOGY | Facility: CLINIC | Age: 64
End: 2019-05-30
Payer: COMMERCIAL

## 2019-05-30 ENCOUNTER — OFFICE VISIT (OUTPATIENT)
Dept: FAMILY MEDICINE | Facility: CLINIC | Age: 64
End: 2019-05-30
Payer: COMMERCIAL

## 2019-05-30 ENCOUNTER — HOSPITAL ENCOUNTER (OUTPATIENT)
Dept: RADIOLOGY | Facility: HOSPITAL | Age: 64
Discharge: HOME | End: 2019-05-30
Attending: NURSE PRACTITIONER
Payer: COMMERCIAL

## 2019-05-30 ENCOUNTER — TELEPHONE (OUTPATIENT)
Dept: OTOLARYNGOLOGY | Facility: CLINIC | Age: 64
End: 2019-05-30

## 2019-05-30 VITALS
SYSTOLIC BLOOD PRESSURE: 126 MMHG | DIASTOLIC BLOOD PRESSURE: 84 MMHG | OXYGEN SATURATION: 98 % | BODY MASS INDEX: 26.75 KG/M2 | HEIGHT: 63 IN | WEIGHT: 151 LBS | HEART RATE: 99 BPM | TEMPERATURE: 99 F

## 2019-05-30 VITALS
DIASTOLIC BLOOD PRESSURE: 87 MMHG | WEIGHT: 151.25 LBS | BODY MASS INDEX: 26.79 KG/M2 | SYSTOLIC BLOOD PRESSURE: 149 MMHG

## 2019-05-30 DIAGNOSIS — R13.10 DYSPHAGIA, UNSPECIFIED TYPE: ICD-10-CM

## 2019-05-30 DIAGNOSIS — R09.A2 GLOBUS SENSATION: ICD-10-CM

## 2019-05-30 DIAGNOSIS — R09.89 CHRONIC THROAT CLEARING: ICD-10-CM

## 2019-05-30 DIAGNOSIS — R51.9 SINUS HEADACHE: Primary | ICD-10-CM

## 2019-05-30 DIAGNOSIS — F32.9 REACTIVE DEPRESSION: Primary | ICD-10-CM

## 2019-05-30 DIAGNOSIS — R51.9 SINUS HEADACHE: ICD-10-CM

## 2019-05-30 PROCEDURE — 70220 XR SINUSES MIN 3 VIEWS: ICD-10-PCS | Mod: 26,,, | Performed by: RADIOLOGY

## 2019-05-30 PROCEDURE — 99999 PR PBB SHADOW E&M-EST. PATIENT-LVL II: CPT | Mod: PBBFAC,,, | Performed by: NURSE PRACTITIONER

## 2019-05-30 PROCEDURE — 99203 OFFICE O/P NEW LOW 30 MIN: CPT | Mod: S$GLB,,, | Performed by: NURSE PRACTITIONER

## 2019-05-30 PROCEDURE — 3008F PR BODY MASS INDEX (BMI) DOCUMENTED: ICD-10-PCS | Mod: CPTII,S$GLB,, | Performed by: NURSE PRACTITIONER

## 2019-05-30 PROCEDURE — 70220 X-RAY EXAM OF SINUSES: CPT | Mod: 26,,, | Performed by: RADIOLOGY

## 2019-05-30 PROCEDURE — 99203 PR OFFICE/OUTPT VISIT, NEW, LEVL III, 30-44 MIN: ICD-10-PCS | Mod: S$GLB,,, | Performed by: NURSE PRACTITIONER

## 2019-05-30 PROCEDURE — 99999 PR PBB SHADOW E&M-EST. PATIENT-LVL II: ICD-10-PCS | Mod: PBBFAC,,, | Performed by: NURSE PRACTITIONER

## 2019-05-30 PROCEDURE — 99215 OFFICE O/P EST HI 40 MIN: CPT | Mod: S$GLB,,, | Performed by: NURSE PRACTITIONER

## 2019-05-30 PROCEDURE — 99999 PR PBB SHADOW E&M-EST. PATIENT-LVL IV: CPT | Mod: PBBFAC,,, | Performed by: NURSE PRACTITIONER

## 2019-05-30 PROCEDURE — 99215 PR OFFICE/OUTPT VISIT, EST, LEVL V, 40-54 MIN: ICD-10-PCS | Mod: S$GLB,,, | Performed by: NURSE PRACTITIONER

## 2019-05-30 PROCEDURE — 3008F BODY MASS INDEX DOCD: CPT | Mod: CPTII,S$GLB,, | Performed by: NURSE PRACTITIONER

## 2019-05-30 PROCEDURE — 70220 X-RAY EXAM OF SINUSES: CPT | Mod: TC,FY,PO

## 2019-05-30 PROCEDURE — 99999 PR PBB SHADOW E&M-EST. PATIENT-LVL IV: ICD-10-PCS | Mod: PBBFAC,,, | Performed by: NURSE PRACTITIONER

## 2019-05-30 RX ORDER — VENLAFAXINE 37.5 MG/1
37.5 TABLET ORAL 2 TIMES DAILY
Qty: 60 TABLET | Refills: 11 | Status: SHIPPED | OUTPATIENT
Start: 2019-05-30 | End: 2019-08-29

## 2019-05-30 NOTE — PATIENT INSTRUCTIONS
1. Decrease wellbutrin to 150 mg for 7 days  2. Stop wellbutrin for 7 days  3. Start Effexor at 37.5 mg and increase dose in about 3 days. Discuss titration  4. You need to see counselor. You also need psych input.  5. If you or your sister have suicidal ideation or homicidal ideation this is a medical emergency. Get to the ER STAT!   John infante.al.

## 2019-05-30 NOTE — PROGRESS NOTES
Subjective:       Patient ID: Fabricio Sawyer is a 64 y.o. female.    Chief Complaint: Other (drainage)    HPI   Patient is new to ENT, self-referred for sinus drainage. He was treated for sinusitis 2 weeks ago with Levaquin & prednisone by his PCP. She states she did not take the Levaquin, only the prednisone. Patient was given antibiotics for sinusitis in 2018, 2018, and for pneumonia in 2019. She states that whenever she lays down, her swallowing reflux is aggravated by excessive mucus draining into the back of her throat. Patient denies significant allergic stigmata:  No itchy, red, watery eyes; no itchy, red, watery nose; no excessive sneezing or stuffiness. Patient denies s/s of acute bacterial sinusitis:  No mucopus from nose or throat, no facial swelling/pain, no dental pain, no diminished olfaction/taste, no sore throat or productive cough.   Patient reports dysphagia, food getting stuck in her esophagus. Sometimes she is able to get it down, but other times she has to vomit it up. Her father  of esophageal cancer.     Review of Systems   Constitutional: Negative.    HENT: Positive for postnasal drip. Negative for congestion, dental problem, facial swelling, rhinorrhea and sneezing.         Frequent throat clearing  Frequent sensation of thick or too much mucus in the back of her throat  Frequent sensation of lump or swelling in the back of her throat   Eyes: Negative.  Negative for discharge, redness and itching.   Respiratory: Negative.    Cardiovascular: Negative.    Gastrointestinal: Negative.    Musculoskeletal: Negative.    Skin: Negative.    Neurological: Positive for headaches.   Hematological: Negative.    Psychiatric/Behavioral: Negative.        Objective:      Physical Exam   Constitutional: She is oriented to person, place, and time. Vital signs are normal. She appears well-developed and well-nourished. She is cooperative. She does not appear ill. No distress.    HENT:   Head: Normocephalic and atraumatic.   Right Ear: Hearing, tympanic membrane, external ear and ear canal normal. Tympanic membrane is not erythematous. No middle ear effusion.   Left Ear: Hearing, tympanic membrane, external ear and ear canal normal. Tympanic membrane is not erythematous.  No middle ear effusion.   Nose: Nose normal. No mucosal edema or rhinorrhea. Right sinus exhibits no maxillary sinus tenderness and no frontal sinus tenderness. Left sinus exhibits no maxillary sinus tenderness and no frontal sinus tenderness.   Mouth/Throat: Uvula is midline, oropharynx is clear and moist and mucous membranes are normal. Mucous membranes are not pale, not dry and not cyanotic. No oral lesions. No oropharyngeal exudate, posterior oropharyngeal edema or posterior oropharyngeal erythema.   Eyes: Pupils are equal, round, and reactive to light. Conjunctivae, EOM and lids are normal. Right eye exhibits no discharge. Left eye exhibits no discharge. No scleral icterus.   Neck: Trachea normal and normal range of motion. Neck supple. No tracheal deviation present. No thyroid mass and no thyromegaly present.   Cardiovascular: Normal rate.   Pulmonary/Chest: Effort normal. No stridor. No respiratory distress. She has no wheezes.   Musculoskeletal: Normal range of motion.   Lymphadenopathy:        Head (right side): No submental, no submandibular, no tonsillar, no preauricular and no posterior auricular adenopathy present.        Head (left side): No submental, no submandibular, no tonsillar, no preauricular and no posterior auricular adenopathy present.     She has no cervical adenopathy.        Right cervical: No superficial cervical and no posterior cervical adenopathy present.       Left cervical: No superficial cervical and no posterior cervical adenopathy present.   Neurological: She is alert and oriented to person, place, and time. She has normal strength. Coordination and gait normal.   Skin: Skin is warm,  dry and intact. No lesion and no rash noted. She is not diaphoretic. No cyanosis. No pallor.   Psychiatric: She has a normal mood and affect. Her speech is normal and behavior is normal. Judgment and thought content normal. Cognition and memory are normal.   Nursing note and vitals reviewed.      Assessment:     Probable LPRD    Dysphagia  R/o sinusitis  Plan:     Discussed typical constellation of symptoms seen with acute allergic rhinitis exacerbation, acute bacterial sinusitis, and LPRD/GERD.      Recommend GI for EGD given family hx of symptoms or dysphagia  Sinus x-rays today -- will notify pt of results as soon available

## 2019-05-30 NOTE — PROGRESS NOTES
This dictation has been generated using Modal Fluency Dictation some phonetic errors may occur. Please contact author for clarification if needed.     Problem List Items Addressed This Visit     None      Visit Diagnoses     Reactive depression    -  Primary    med change. go see counselor. psych referral.  decrease wellbutrin xl 300 mg to 150 mg for one week.   stop for 1-7 days then start Effexor low dose and titrate            Patient Instructions   1. Decrease wellbutrin to 150 mg for 7 days  2. Stop wellbutrin for 7 days  3. Start Effexor at 37.5 mg and increase dose in about 3 days. Discuss titration  4. You need to see counselor. You also need psych input.  5. If you or your sister have suicidal ideation or homicidal ideation this is a medical emergency. Get to the ER STAT!   John mac.   Effexor sent    Follow up in about 2 weeks (around 6/13/2019).    ________________________________________________________________  ________________________________________________________________      Chief Complaint   Patient presents with    Medication Review     History of present illness  This 64 y.o. presents today for complaint of depression.  Patient notes situational anxiety and caregiver stress.  She notes that her sister son her nephew had any aneurysm about 10 months ago.  He was able to survive and is gradually improving.  He is able to feed himself and speak.  There is a lot of support for her sister in patient notes this strain.  Lately she has felt more depressed.  They increased her medication 1 month ago however she still does not feel like she has any energy.  She states that it feels like she has concrete boots.  She is not motivated to do things.  She denies anxiety symptoms.  She denies suicidal or violent ideation.  We discussed her sister and the need for all of the family to speak to counseling.  I have also encouraged the patient to follow up with Psychiatry.  Complete review of systems  otherwise negative    Past medical and social history reviewed.  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  No excessive alcohol intake.    Past Medical History:   Diagnosis Date    Abnormal Pap smear     about 15 years ago coned and cryo normals ever since    Abnormal Pap smear of vagina     Breast disorder     implants one is slightly leaking    Colon polyps     last cscope 2010 clear    Hyperlipidemia     Hypothyroid 3/24/2014    MVP (mitral valve prolapse)        Past Surgical History:   Procedure Laterality Date    APPENDECTOMY      breast implants      HYSTERECTOMY      left hand surgery      OOPHORECTOMY      TONSILLECTOMY      TUBAL LIGATION         Family History   Problem Relation Age of Onset    Cancer Father         esophageal    Hypertension Father     Cancer Mother         throat    Hypertension Brother     Hyperlipidemia Unknown     Ovarian cancer Paternal Grandmother     Breast cancer Neg Hx        Social History     Socioeconomic History    Marital status:      Spouse name: Not on file    Number of children: Not on file    Years of education: Not on file    Highest education level: Not on file   Occupational History    Occupation: teacher   Social Needs    Financial resource strain: Not on file    Food insecurity:     Worry: Not on file     Inability: Not on file    Transportation needs:     Medical: Not on file     Non-medical: Not on file   Tobacco Use    Smoking status: Never Smoker    Smokeless tobacco: Never Used   Substance and Sexual Activity    Alcohol use: Yes     Comment: ocassionally    Drug use: No    Sexual activity: Yes     Partners: Male   Lifestyle    Physical activity:     Days per week: Not on file     Minutes per session: Not on file    Stress: Not on file   Relationships    Social connections:     Talks on phone: Not on file     Gets together: Not on file     Attends Yazidi service: Not on file     Active member of  club or organization: Not on file     Attends meetings of clubs or organizations: Not on file     Relationship status: Not on file   Other Topics Concern    Not on file   Social History Narrative    Not on file       Current Outpatient Medications   Medication Sig Dispense Refill    ARMOUR THYROID 90 mg Tab TAKE 1 TABLET (90 MG) BY MOUTH DAILY ON AN EMPTY STOMACH  0    benzphetamine 50 mg Tab TK 1 T PO DAILY  0    buPROPion (WELLBUTRIN XL) 300 MG 24 hr tablet TAKE 1 TABLET (300 MG TOTAL) BY MOUTH ONCE DAILY. 30 tablet 0    estradiol (VIVELLE-DOT) 0.075 mg/24 hr Place 1 patch onto the skin once a week.       progesterone (PROMETRIUM) 200 MG capsule Take 200 mg by mouth nightly.  1    sumatriptan (IMITREX) 100 MG tablet TAKE 1 TABLET BY MOUTH EVERY 2 HOURS AS NEEDED FOR MIGRAINE - MAX 2 DOSES IN 24 HOURS 6 tablet 3     No current facility-administered medications for this visit.        Review of patient's allergies indicates:   Allergen Reactions    Codeine      Does not know    Fish containing products Itching     Itching in mouth & throat    Penicillins Swelling       Physical examination  Vitals Reviewed  Gen. Well-dressed well-nourished   Skin warm dry and intact.  No rashes noted.  Neuro. Awake alert oriented x4.  Normal judgment and cognition noted.  Extremities no clubbing cyanosis or edema noted.     Call or return to clinic prn if these symptoms worsen or fail to improve as anticipated.  In excessive of 45 minutes spent with patient with greater than 50% of time dedicated to education on symptoms, diagnosis, treatments, and coordination of care.

## 2019-05-30 NOTE — TELEPHONE ENCOUNTER
----- Message from Abigail Khan NP sent at 5/30/2019 12:58 PM CDT -----  Sinuses are all open and clear -- no fluid or infection.

## 2019-06-02 ENCOUNTER — PATIENT MESSAGE (OUTPATIENT)
Dept: FAMILY MEDICINE | Facility: CLINIC | Age: 64
End: 2019-06-02

## 2019-06-03 ENCOUNTER — PATIENT MESSAGE (OUTPATIENT)
Dept: FAMILY MEDICINE | Facility: CLINIC | Age: 64
End: 2019-06-03

## 2019-06-03 RX ORDER — BUPROPION HYDROCHLORIDE 150 MG/1
150 TABLET ORAL DAILY
Qty: 14 TABLET | Refills: 0 | Status: SHIPPED | OUTPATIENT
Start: 2019-06-03 | End: 2019-08-29

## 2019-06-07 ENCOUNTER — PATIENT MESSAGE (OUTPATIENT)
Dept: GASTROENTEROLOGY | Facility: CLINIC | Age: 64
End: 2019-06-07

## 2019-07-01 NOTE — TELEPHONE ENCOUNTER
I forwarded this to Aroldo because he saw her recently and made some adjustments. I don't know exactly what she's taking so I'm not going to refill this myself.   I have not seen her more than a year. I'm happy to fill up once I know what is the correct and stable those until then I feel he could handle this better.

## 2019-07-10 RX ORDER — BUPROPION HYDROCHLORIDE 300 MG/1
300 TABLET ORAL DAILY
Qty: 30 TABLET | Refills: 0 | OUTPATIENT
Start: 2019-07-10 | End: 2020-07-09

## 2019-08-29 ENCOUNTER — OFFICE VISIT (OUTPATIENT)
Dept: FAMILY MEDICINE | Facility: CLINIC | Age: 64
End: 2019-08-29
Payer: COMMERCIAL

## 2019-08-29 VITALS
RESPIRATION RATE: 16 BRPM | HEART RATE: 72 BPM | TEMPERATURE: 99 F | SYSTOLIC BLOOD PRESSURE: 128 MMHG | WEIGHT: 158.19 LBS | HEIGHT: 63 IN | DIASTOLIC BLOOD PRESSURE: 72 MMHG | BODY MASS INDEX: 28.03 KG/M2

## 2019-08-29 DIAGNOSIS — F41.9 ANXIETY: ICD-10-CM

## 2019-08-29 DIAGNOSIS — N95.1 MENOPAUSAL SYNDROME: ICD-10-CM

## 2019-08-29 DIAGNOSIS — F32.A DEPRESSION, UNSPECIFIED DEPRESSION TYPE: ICD-10-CM

## 2019-08-29 DIAGNOSIS — E03.9 HYPOTHYROIDISM, UNSPECIFIED TYPE: ICD-10-CM

## 2019-08-29 DIAGNOSIS — G43.809 OTHER MIGRAINE WITHOUT STATUS MIGRAINOSUS, NOT INTRACTABLE: Primary | ICD-10-CM

## 2019-08-29 PROCEDURE — 99214 PR OFFICE/OUTPT VISIT, EST, LEVL IV, 30-39 MIN: ICD-10-PCS | Mod: S$GLB,,, | Performed by: FAMILY MEDICINE

## 2019-08-29 PROCEDURE — 99999 PR PBB SHADOW E&M-EST. PATIENT-LVL III: CPT | Mod: PBBFAC,,, | Performed by: FAMILY MEDICINE

## 2019-08-29 PROCEDURE — 99214 OFFICE O/P EST MOD 30 MIN: CPT | Mod: S$GLB,,, | Performed by: FAMILY MEDICINE

## 2019-08-29 PROCEDURE — 99999 PR PBB SHADOW E&M-EST. PATIENT-LVL III: ICD-10-PCS | Mod: PBBFAC,,, | Performed by: FAMILY MEDICINE

## 2019-08-29 PROCEDURE — 3008F BODY MASS INDEX DOCD: CPT | Mod: CPTII,S$GLB,, | Performed by: FAMILY MEDICINE

## 2019-08-29 PROCEDURE — 3008F PR BODY MASS INDEX (BMI) DOCUMENTED: ICD-10-PCS | Mod: CPTII,S$GLB,, | Performed by: FAMILY MEDICINE

## 2019-08-29 RX ORDER — RIZATRIPTAN BENZOATE 10 MG/1
10 TABLET, ORALLY DISINTEGRATING ORAL
Qty: 9 TABLET | Refills: 5 | Status: SHIPPED | OUTPATIENT
Start: 2019-08-29 | End: 2020-09-25

## 2019-08-29 RX ORDER — AZELASTINE 1 MG/ML
2 SPRAY, METERED NASAL DAILY PRN
Refills: 5 | COMMUNITY
Start: 2019-07-24 | End: 2020-11-03

## 2019-08-29 RX ORDER — THYROID, PORCINE 120 MG/1
TABLET ORAL
Refills: 0 | COMMUNITY
Start: 2019-07-24 | End: 2019-08-29

## 2019-08-29 RX ORDER — PANTOPRAZOLE SODIUM 40 MG/1
40 TABLET, DELAYED RELEASE ORAL 2 TIMES DAILY
Refills: 11 | COMMUNITY
Start: 2019-07-23 | End: 2021-08-17

## 2019-08-29 RX ORDER — ESTRADIOL 0.05 MG/D
FILM, EXTENDED RELEASE TRANSDERMAL
Refills: 1 | COMMUNITY
Start: 2019-07-24 | End: 2019-08-29 | Stop reason: SDUPTHER

## 2019-08-29 RX ORDER — VENLAFAXINE HYDROCHLORIDE 75 MG/1
75 CAPSULE, EXTENDED RELEASE ORAL DAILY
Qty: 30 CAPSULE | Refills: 5 | Status: SHIPPED | OUTPATIENT
Start: 2019-08-29 | End: 2020-03-09

## 2019-08-29 NOTE — PROGRESS NOTES
THIS DOCUMENT WAS MADE IN PART WITH VOICE RECOGNITION SOFTWARE.  OCCASIONALLY THIS SOFTWARE WILL MISINTERPRET WORDS OR PHRASES.      Fabricio Aiken Digna  1955    Fabricio was seen today for follow-up.    Diagnoses and all orders for this visit:    Other migraine without status migrainosus, not intractable   Imitrex has worked well but does have some side effects. Will try Maxalt. If unsuccessful can return to Imitrex. Also discussed taking women and said at the time of migraine. Discussed side effects of Imitrex and Maxalt type medications. Must monitor as she gets older    Menopausal syndrome  Anxiety  Depression, unspecified depression type   improved, switch to Effexor XR 75 mg once a day, discontinue the 37.5  BID    Hypothyroidism, unspecified type   noted but this is managed elsewere    Other orders  -     rizatriptan (MAXALT-MLT) 10 MG disintegrating tablet; Take 1 tablet (10 mg total) by mouth as needed for Migraine. May repeat in 2 hours if needed  -     venlafaxine (EFFEXOR-XR) 75 MG 24 hr capsule; Take 1 capsule (75 mg total) by mouth once daily.    Appointment duration greater than 25 min., more than 50% face-to-face counseling      Subjective     Chief Complaint   Patient presents with    Follow-up       HPI       primary concern is following up migraines. Has been on Imitrex, this has worked well for many years. Although she does have some mild side effects tightness in the throat area shortly after taking.  The swelling and no rash. Previously prescribed by her oncologist but has not been seen recently and prescription not refilled. Typically one or two every 2 months or less     anxiety and depression. Significantly improved with recent medication changes, now on Effexor. She does ask about the extended release/once a day version    Active Ambulatory Problems     Diagnosis Date Noted    Hyperlipidemia     Greater trochanteric bursitis 02/13/2012    Incontinence of urine- mild 03/26/2013     "Hypothyroid 03/24/2014    Anxiety 09/12/2014    MVP (mitral valve prolapse) 06/28/2017     Resolved Ambulatory Problems     Diagnosis Date Noted    No Resolved Ambulatory Problems     Past Medical History:   Diagnosis Date    Abnormal Pap smear     Abnormal Pap smear of vagina     Breast disorder     Colon polyps     Hyperlipidemia     Hypothyroid 3/24/2014    MVP (mitral valve prolapse)          Review of Systems   Respiratory: Negative for shortness of breath.    Cardiovascular: Negative for chest pain.   Neurological: Positive for headaches.   Psychiatric/Behavioral: Negative for dysphoric mood and suicidal ideas.       Objective     Physical Exam   Constitutional: She is oriented to person, place, and time. She appears well-developed and well-nourished. No distress.   HENT:   Head: Normocephalic and atraumatic.   Eyes: No scleral icterus.   Cardiovascular: Normal rate, regular rhythm and normal heart sounds.   Pulmonary/Chest: Effort normal. No respiratory distress.   Neurological: She is alert and oriented to person, place, and time.   Skin: She is not diaphoretic.   Psychiatric: She has a normal mood and affect. Her behavior is normal.   Vitals reviewed.    Vitals:    08/29/19 1602   BP: 128/72   BP Location: Right arm   Patient Position: Sitting   BP Method: Medium (Manual)   Pulse: 72   Resp: 16   Temp: 98.5 °F (36.9 °C)   TempSrc: Oral   Weight: 71.7 kg (158 lb 2.9 oz)   Height: 5' 3" (1.6 m)       MOST RECENT LABS IN OUR ELECTRONIC MEDICAL RECORD:     Results for orders placed or performed in visit on 01/28/19   Fecal Immunochemical Test (iFOBT)   Result Value Ref Range    Fecal Immunochemical Test (iFOBT) Negative Negative         "

## 2019-09-04 ENCOUNTER — OFFICE VISIT (OUTPATIENT)
Dept: FAMILY MEDICINE | Facility: CLINIC | Age: 64
End: 2019-09-04
Payer: COMMERCIAL

## 2019-09-04 VITALS
HEART RATE: 65 BPM | OXYGEN SATURATION: 95 % | BODY MASS INDEX: 28.57 KG/M2 | DIASTOLIC BLOOD PRESSURE: 82 MMHG | HEIGHT: 63 IN | WEIGHT: 161.25 LBS | SYSTOLIC BLOOD PRESSURE: 124 MMHG | TEMPERATURE: 99 F

## 2019-09-04 DIAGNOSIS — J30.9 ALLERGIC SINUSITIS: Primary | ICD-10-CM

## 2019-09-04 PROCEDURE — 99999 PR PBB SHADOW E&M-EST. PATIENT-LVL IV: ICD-10-PCS | Mod: PBBFAC,,, | Performed by: NURSE PRACTITIONER

## 2019-09-04 PROCEDURE — 99999 PR PBB SHADOW E&M-EST. PATIENT-LVL IV: CPT | Mod: PBBFAC,,, | Performed by: NURSE PRACTITIONER

## 2019-09-04 PROCEDURE — 3008F PR BODY MASS INDEX (BMI) DOCUMENTED: ICD-10-PCS | Mod: CPTII,S$GLB,, | Performed by: NURSE PRACTITIONER

## 2019-09-04 PROCEDURE — 99214 PR OFFICE/OUTPT VISIT, EST, LEVL IV, 30-39 MIN: ICD-10-PCS | Mod: S$GLB,,, | Performed by: NURSE PRACTITIONER

## 2019-09-04 PROCEDURE — 99214 OFFICE O/P EST MOD 30 MIN: CPT | Mod: S$GLB,,, | Performed by: NURSE PRACTITIONER

## 2019-09-04 PROCEDURE — 3008F BODY MASS INDEX DOCD: CPT | Mod: CPTII,S$GLB,, | Performed by: NURSE PRACTITIONER

## 2019-09-04 RX ORDER — BUTALBITAL, ACETAMINOPHEN AND CAFFEINE 50; 325; 40 MG/1; MG/1; MG/1
1 TABLET ORAL EVERY 6 HOURS PRN
Qty: 15 TABLET | Refills: 2 | Status: SHIPPED | OUTPATIENT
Start: 2019-09-04 | End: 2019-09-14

## 2019-09-04 RX ORDER — PREDNISONE 20 MG/1
20 TABLET ORAL 2 TIMES DAILY
Qty: 10 TABLET | Refills: 0 | Status: SHIPPED | OUTPATIENT
Start: 2019-09-04 | End: 2019-09-09

## 2019-09-04 NOTE — PROGRESS NOTES
This dictation has been generated using Modal Fluency Dictation some phonetic errors may occur. Please contact author for clarification if needed.     Problem List Items Addressed This Visit     None      Visit Diagnoses     Allergic sinusitis    -  Primary          Orders Placed This Encounter    predniSONE (DELTASONE) 20 MG tablet    butalbital-acetaminophen-caffeine -40 mg (FIORICET, ESGIC) -40 mg per tablet     Patient Instructions   Zyrtec 10 mg twice a day for 2 weeks. Then daily for a month.   Continue nasal sprays.      Allergic sinusitis treat with meds as above.  Headache med as above.    Follow up if symptoms worsen or fail to improve.    ________________________________________________________________  ________________________________________________________________      Chief Complaint   Patient presents with    Facial Pain     left side facial pain times 48hours, low grade fever     History of present illness  This 64 y.o. presents today for complaint of sinus issues.  Patient notes postnasal drip and sinus congestion. She has had some sinus pressure.  She denies any fever chills.  Patient notes having allergy issues.  She has a cloudy head.  She notes that she has seen myself ENT and Gastroenterology for these issues.  She thought maybe reflux was contributing to her sinus problems. She had a EGD and she states they noted some sort of allergy problem in her esophagus.  She has an appointment to discuss with Gastroenterology.  Review of systems  No fever or chills  Denies earache  No chest pain shortness of breath palpitations  No nausea vomiting diarrhea    Past medical and social history reviewed.    Past Medical History:   Diagnosis Date    Abnormal Pap smear     about 15 years ago coned and cryo normals ever since    Abnormal Pap smear of vagina     Breast disorder     implants one is slightly leaking    Colon polyps     last cscope 2010 clear    Hyperlipidemia     Hypothyroid  3/24/2014    MVP (mitral valve prolapse)        Past Surgical History:   Procedure Laterality Date    APPENDECTOMY      breast implants      HYSTERECTOMY      left hand surgery      OOPHORECTOMY      TONSILLECTOMY      TUBAL LIGATION         Family History   Problem Relation Age of Onset    Cancer Father         esophageal    Hypertension Father     Cancer Mother         throat    Hypertension Brother     Hyperlipidemia Unknown     Ovarian cancer Paternal Grandmother     Breast cancer Neg Hx        Social History     Socioeconomic History    Marital status:      Spouse name: Not on file    Number of children: Not on file    Years of education: Not on file    Highest education level: Not on file   Occupational History    Occupation: teacher   Social Needs    Financial resource strain: Not on file    Food insecurity:     Worry: Not on file     Inability: Not on file    Transportation needs:     Medical: Not on file     Non-medical: Not on file   Tobacco Use    Smoking status: Never Smoker    Smokeless tobacco: Never Used   Substance and Sexual Activity    Alcohol use: Yes     Comment: ocassionally    Drug use: No    Sexual activity: Yes     Partners: Male   Lifestyle    Physical activity:     Days per week: Not on file     Minutes per session: Not on file    Stress: Not on file   Relationships    Social connections:     Talks on phone: Not on file     Gets together: Not on file     Attends Confucianism service: Not on file     Active member of club or organization: Not on file     Attends meetings of clubs or organizations: Not on file     Relationship status: Not on file   Other Topics Concern    Not on file   Social History Narrative    Not on file       Current Outpatient Medications   Medication Sig Dispense Refill    ARMOUR THYROID 90 mg Tab TAKE 1 TABLET (90 MG) BY MOUTH DAILY ON AN EMPTY STOMACH  0    estradiol (VIVELLE-DOT) 0.075 mg/24 hr Place 1 patch onto the skin once  a week.       pantoprazole (PROTONIX) 40 MG tablet Take 40 mg by mouth 2 (two) times daily.   11    progesterone (PROMETRIUM) 200 MG capsule Take 200 mg by mouth nightly.  1    venlafaxine (EFFEXOR-XR) 75 MG 24 hr capsule Take 1 capsule (75 mg total) by mouth once daily. 30 capsule 5    azelastine (ASTELIN) 137 mcg (0.1 %) nasal spray SPRAY 2 SPRAYS IN EACH NOSTRIL TWICE DAILY FOR 30 DAYS  5    butalbital-acetaminophen-caffeine -40 mg (FIORICET, ESGIC) -40 mg per tablet Take 1 tablet by mouth every 6 (six) hours as needed for Pain. 15 tablet 2    predniSONE (DELTASONE) 20 MG tablet Take 1 tablet (20 mg total) by mouth 2 (two) times daily. for 5 days 10 tablet 0    rizatriptan (MAXALT-MLT) 10 MG disintegrating tablet Take 1 tablet (10 mg total) by mouth as needed for Migraine. May repeat in 2 hours if needed 9 tablet 5     No current facility-administered medications for this visit.        Review of patient's allergies indicates:   Allergen Reactions    Codeine      Does not know    Fish containing products Itching     Itching in mouth & throat    Penicillins Swelling       Physical examination  Vitals Reviewed  Gen. Well-dressed well-nourished   Skin warm dry and intact.  No rashes noted.  HEENT.  TM intact bilateral with normal light reflex.  No mastoid tenderness during percussion.  Nares patent bilateral.  Pharynx is unremarkable.  No maxillary or frontal sinus tenderness when percussed.    Neck is supple without adenopathy  Chest.  Respirations are even unlabored.  Lungs are clear to auscultation.  Cardiac regular rate and rhythm.  No chest wall adenopathy noted.  Neuro. Awake alert oriented x4.  Normal judgment and cognition noted.  Extremities no clubbing cyanosis or edema noted.     Call or return to clinic prn if these symptoms worsen or fail to improve as anticipated.

## 2020-03-09 RX ORDER — VENLAFAXINE HYDROCHLORIDE 75 MG/1
CAPSULE, EXTENDED RELEASE ORAL
Qty: 30 CAPSULE | Refills: 5 | Status: SHIPPED | OUTPATIENT
Start: 2020-03-09 | End: 2020-06-12

## 2020-06-12 ENCOUNTER — LAB VISIT (OUTPATIENT)
Dept: LAB | Facility: HOSPITAL | Age: 65
End: 2020-06-12
Attending: FAMILY MEDICINE
Payer: COMMERCIAL

## 2020-06-12 ENCOUNTER — OFFICE VISIT (OUTPATIENT)
Dept: FAMILY MEDICINE | Facility: CLINIC | Age: 65
End: 2020-06-12
Payer: COMMERCIAL

## 2020-06-12 VITALS
OXYGEN SATURATION: 98 % | BODY MASS INDEX: 30.88 KG/M2 | SYSTOLIC BLOOD PRESSURE: 126 MMHG | DIASTOLIC BLOOD PRESSURE: 80 MMHG | HEIGHT: 63 IN | WEIGHT: 174.25 LBS | HEART RATE: 84 BPM | TEMPERATURE: 98 F

## 2020-06-12 DIAGNOSIS — Z00.00 PREVENTATIVE HEALTH CARE: Primary | ICD-10-CM

## 2020-06-12 DIAGNOSIS — Z00.00 PREVENTATIVE HEALTH CARE: ICD-10-CM

## 2020-06-12 DIAGNOSIS — E03.9 HYPOTHYROIDISM, UNSPECIFIED TYPE: ICD-10-CM

## 2020-06-12 DIAGNOSIS — F32.A DEPRESSION, UNSPECIFIED DEPRESSION TYPE: ICD-10-CM

## 2020-06-12 LAB
ALBUMIN SERPL BCP-MCNC: 3.8 G/DL (ref 3.5–5.2)
ALP SERPL-CCNC: 94 U/L (ref 55–135)
ALT SERPL W/O P-5'-P-CCNC: 16 U/L (ref 10–44)
ANION GAP SERPL CALC-SCNC: 7 MMOL/L (ref 8–16)
AST SERPL-CCNC: 18 U/L (ref 10–40)
BILIRUB SERPL-MCNC: 0.2 MG/DL (ref 0.1–1)
BUN SERPL-MCNC: 20 MG/DL (ref 8–23)
CALCIUM SERPL-MCNC: 9.3 MG/DL (ref 8.7–10.5)
CHLORIDE SERPL-SCNC: 106 MMOL/L (ref 95–110)
CHOLEST SERPL-MCNC: 262 MG/DL (ref 120–199)
CHOLEST/HDLC SERPL: 3.6 {RATIO} (ref 2–5)
CO2 SERPL-SCNC: 25 MMOL/L (ref 23–29)
CREAT SERPL-MCNC: 0.7 MG/DL (ref 0.5–1.4)
EST. GFR  (AFRICAN AMERICAN): >60 ML/MIN/1.73 M^2
EST. GFR  (NON AFRICAN AMERICAN): >60 ML/MIN/1.73 M^2
GLUCOSE SERPL-MCNC: 80 MG/DL (ref 70–110)
HDLC SERPL-MCNC: 73 MG/DL (ref 40–75)
HDLC SERPL: 27.9 % (ref 20–50)
LDLC SERPL CALC-MCNC: 165.6 MG/DL (ref 63–159)
NONHDLC SERPL-MCNC: 189 MG/DL
POTASSIUM SERPL-SCNC: 4.7 MMOL/L (ref 3.5–5.1)
PROT SERPL-MCNC: 7.3 G/DL (ref 6–8.4)
SODIUM SERPL-SCNC: 138 MMOL/L (ref 136–145)
TRIGL SERPL-MCNC: 117 MG/DL (ref 30–150)

## 2020-06-12 PROCEDURE — 99396 PREV VISIT EST AGE 40-64: CPT | Mod: S$GLB,,, | Performed by: FAMILY MEDICINE

## 2020-06-12 PROCEDURE — 99396 PR PREVENTIVE VISIT,EST,40-64: ICD-10-PCS | Mod: S$GLB,,, | Performed by: FAMILY MEDICINE

## 2020-06-12 PROCEDURE — 80053 COMPREHEN METABOLIC PANEL: CPT

## 2020-06-12 PROCEDURE — 36415 COLL VENOUS BLD VENIPUNCTURE: CPT | Mod: PN

## 2020-06-12 PROCEDURE — 99999 PR PBB SHADOW E&M-EST. PATIENT-LVL III: ICD-10-PCS | Mod: PBBFAC,,, | Performed by: FAMILY MEDICINE

## 2020-06-12 PROCEDURE — 85025 COMPLETE CBC W/AUTO DIFF WBC: CPT

## 2020-06-12 PROCEDURE — 80061 LIPID PANEL: CPT

## 2020-06-12 PROCEDURE — 99999 PR PBB SHADOW E&M-EST. PATIENT-LVL III: CPT | Mod: PBBFAC,,, | Performed by: FAMILY MEDICINE

## 2020-06-12 RX ORDER — PSEUDOEPHEDRINE HCL 120 MG/1
120 TABLET, FILM COATED, EXTENDED RELEASE ORAL EVERY 12 HOURS PRN
COMMUNITY
End: 2022-12-21

## 2020-06-12 RX ORDER — DESVENLAFAXINE SUCCINATE 50 MG/1
50 TABLET, EXTENDED RELEASE ORAL DAILY
Qty: 30 TABLET | Refills: 5 | Status: SHIPPED | OUTPATIENT
Start: 2020-06-12 | End: 2020-08-05

## 2020-06-12 NOTE — PROGRESS NOTES
THIS DOCUMENT WAS MADE IN PART WITH VOICE RECOGNITION SOFTWARE.  OCCASIONALLY THIS SOFTWARE WILL MISINTERPRET WORDS OR PHRASES.      Fabricio Sawyer  1955    Fabricio was seen today for annual exam.    Diagnoses and all orders for this visit:    Preventative health care  -     Lipid Panel; Future  -     CBC auto differential; Future  -     Comprehensive metabolic panel; Future    Depression, unspecified depression type  She has had some improvement with the Effexor but still feels lack of motivation.  Some side effects including increased perspiration not to trouble some but I do not want to make these worse with increasing the Effexor, will try a similar alternative 1st.  Try Pristiq.  Could consider adding a low-dose Wellbutrin depending on results  Follow in 4 weeks    Hypothyroidism, unspecified type  Managed elsewhere    Other orders  -     desvenlafaxine succinate (PRISTIQ) 50 MG Tb24; Take 1 tablet (50 mg total) by mouth once daily.    I do feel she should follow back with ENT doctor regarding drainage and to get results of her CT scan alternatively if she does not get adequate feedback I have suggested consulting with our ENT physician      Subjective     Chief Complaint   Patient presents with    Annual Exam     patient would also like to go over her medications        HPI:       effexor has helped with mood, but now feeling, less motivation    Mucous in throat, swallowing  Saw wu Hernandez, dilation  But not resolved, saw Dr. Monzon, had scan of sinuses, did not return so did not get results      Active Ambulatory Problems     Diagnosis Date Noted    Hyperlipidemia     Greater trochanteric bursitis 02/13/2012    Incontinence of urine- mild 03/26/2013    Hypothyroid 03/24/2014    Anxiety 09/12/2014    MVP (mitral valve prolapse) 06/28/2017     Resolved Ambulatory Problems     Diagnosis Date Noted    No Resolved Ambulatory Problems     Past Medical History:   Diagnosis Date    Abnormal Pap  smear     Abnormal Pap smear of vagina     Breast disorder     Colon polyps        Current Outpatient Medications on File Prior to Visit   Medication Sig Dispense Refill    ARMOUR THYROID 90 mg Tab 155 mg.   0    azelastine (ASTELIN) 137 mcg (0.1 %) nasal spray SPRAY 2 SPRAYS IN EACH NOSTRIL TWICE DAILY FOR 30 DAYS  5    estradiol (VIVELLE-DOT) 0.075 mg/24 hr Place 1 patch onto the skin once a week.       pantoprazole (PROTONIX) 40 MG tablet Take 40 mg by mouth 2 (two) times daily.   11    progesterone (PROMETRIUM) 200 MG capsule Take 200 mg by mouth nightly.  1    pseudoephedrine (SUDAFED) 120 mg 12 hr tablet Take 120 mg by mouth every 12 (twelve) hours.      rizatriptan (MAXALT-MLT) 10 MG disintegrating tablet Take 1 tablet (10 mg total) by mouth as needed for Migraine. May repeat in 2 hours if needed 9 tablet 5     No current facility-administered medications on file prior to visit.        Review of patient's allergies indicates:   Allergen Reactions    Codeine      Does not know    Fish containing products Itching     Itching in mouth & throat    Penicillins Swelling       Family History   Problem Relation Age of Onset    Cancer Father         esophageal    Hypertension Father     Cancer Mother         throat    Hypertension Brother     Hyperlipidemia Unknown     Ovarian cancer Paternal Grandmother     Breast cancer Neg Hx        Social History     Tobacco Use    Smoking status: Never Smoker    Smokeless tobacco: Never Used   Substance Use Topics    Alcohol use: Yes     Comment: ocassionally    Drug use: No         Review of Systems   Constitutional: Negative for activity change and unexpected weight change.   Respiratory: Negative.  Negative for shortness of breath.    Cardiovascular: Negative.  Negative for chest pain.   Gastrointestinal: Negative.    Musculoskeletal: Negative.    Neurological: Negative.    Psychiatric/Behavioral: Positive for dysphoric mood. Negative for sleep  "disturbance and suicidal ideas.       Objective     Physical Exam  Vitals signs reviewed.   Constitutional:       General: She is not in acute distress.     Appearance: She is well-developed. She is not toxic-appearing or diaphoretic.   HENT:      Head: Normocephalic and atraumatic.      Right Ear: Tympanic membrane, ear canal and external ear normal.      Left Ear: Tympanic membrane, ear canal and external ear normal.      Nose: Nose normal.      Mouth/Throat:      Pharynx: No oropharyngeal exudate.   Eyes:      General: No scleral icterus.     Conjunctiva/sclera: Conjunctivae normal.      Pupils: Pupils are equal, round, and reactive to light.   Neck:      Musculoskeletal: Normal range of motion and neck supple.      Thyroid: No thyromegaly.      Vascular: No JVD.      Trachea: No tracheal deviation.   Cardiovascular:      Rate and Rhythm: Normal rate and regular rhythm.      Chest Wall: PMI is not displaced.      Heart sounds: Normal heart sounds. No murmur. No friction rub. No gallop.    Pulmonary:      Effort: Pulmonary effort is normal. No respiratory distress.      Breath sounds: Normal breath sounds. No wheezing or rales.   Abdominal:      General: Bowel sounds are normal. There is no distension.      Palpations: Abdomen is soft. There is no mass.      Tenderness: There is no abdominal tenderness. There is no guarding or rebound.   Lymphadenopathy:      Cervical: No cervical adenopathy.   Skin:     General: Skin is dry.      Coloration: Skin is not pale.      Findings: No rash.   Neurological:      Mental Status: She is alert and oriented to person, place, and time.      Cranial Nerves: No cranial nerve deficit.      Motor: No abnormal muscle tone.      Deep Tendon Reflexes: Reflexes normal.   Psychiatric:         Behavior: Behavior normal.         Vitals:    06/12/20 1538   BP: 126/80   Pulse: 84   Temp: 97.9 °F (36.6 °C)   TempSrc: Temporal   SpO2: 98%   Weight: 79.1 kg (174 lb 4.4 oz)   Height: 5' 3" (1.6 " m)       MOST RECENT LABS IN OUR ELECTRONIC MEDICAL RECORD:     Results for orders placed or performed in visit on 01/28/19   Fecal Immunochemical Test (iFOBT)   Result Value Ref Range    Fecal Immunochemical Test (iFOBT) Negative Negative         Age specific and appropriate preventative healthcare discussed/ health maintenance reviewed. Discussed healthy diet and regular exercise. Discussed age-appropriate preventative screening tests and recommendations. Discussed recommended follow-up based on age and conditions.

## 2020-06-13 LAB
BASOPHILS # BLD AUTO: 0.06 K/UL (ref 0–0.2)
BASOPHILS NFR BLD: 0.7 % (ref 0–1.9)
DIFFERENTIAL METHOD: ABNORMAL
EOSINOPHIL # BLD AUTO: 0.3 K/UL (ref 0–0.5)
EOSINOPHIL NFR BLD: 3.7 % (ref 0–8)
ERYTHROCYTE [DISTWIDTH] IN BLOOD BY AUTOMATED COUNT: 13.2 % (ref 11.5–14.5)
HCT VFR BLD AUTO: 45.7 % (ref 37–48.5)
HGB BLD-MCNC: 13.9 G/DL (ref 12–16)
IMM GRANULOCYTES # BLD AUTO: 0.02 K/UL (ref 0–0.04)
IMM GRANULOCYTES NFR BLD AUTO: 0.2 % (ref 0–0.5)
LYMPHOCYTES # BLD AUTO: 2.6 K/UL (ref 1–4.8)
LYMPHOCYTES NFR BLD: 31.4 % (ref 18–48)
MCH RBC QN AUTO: 27.7 PG (ref 27–31)
MCHC RBC AUTO-ENTMCNC: 30.4 G/DL (ref 32–36)
MCV RBC AUTO: 91 FL (ref 82–98)
MONOCYTES # BLD AUTO: 0.6 K/UL (ref 0.3–1)
MONOCYTES NFR BLD: 7.2 % (ref 4–15)
NEUTROPHILS # BLD AUTO: 4.7 K/UL (ref 1.8–7.7)
NEUTROPHILS NFR BLD: 56.8 % (ref 38–73)
NRBC BLD-RTO: 0 /100 WBC
PLATELET # BLD AUTO: 351 K/UL (ref 150–350)
PMV BLD AUTO: 9.5 FL (ref 9.2–12.9)
RBC # BLD AUTO: 5.01 M/UL (ref 4–5.4)
WBC # BLD AUTO: 8.21 K/UL (ref 3.9–12.7)

## 2020-06-15 ENCOUNTER — TELEPHONE (OUTPATIENT)
Dept: FAMILY MEDICINE | Facility: CLINIC | Age: 65
End: 2020-06-15

## 2020-06-15 NOTE — TELEPHONE ENCOUNTER
Pt's last note indicates pt was changed from effexor to pristiq. Pt is wondering if she just changes this or if there is any specific titration when changing these?

## 2020-06-15 NOTE — TELEPHONE ENCOUNTER
No titration since the medicines are similar we are going to switch to 50 mg of the Pristiq see how she does and then we can titrate up if needed.  Just stop taking the Effexor and the following day take Pristiq in place

## 2020-06-15 NOTE — TELEPHONE ENCOUNTER
----- Message from Kristen Urbina sent at 6/15/2020  2:24 PM CDT -----  Contact: pt  Pt states that she came in and saw the Dr on 6/12 and he changed her medication but didn't explain how she should switch from the old to the new medication. Pt would like a call back to discuss...[phone] (home)

## 2020-06-16 NOTE — TELEPHONE ENCOUNTER
Spoke w/ pt. Advised as recommended. Pt agreed, repeated instructions and will call back in a few weeks to let Dr Brown how it works. She will keep appt on 7/13/20

## 2020-06-29 ENCOUNTER — PATIENT OUTREACH (OUTPATIENT)
Dept: ADMINISTRATIVE | Facility: HOSPITAL | Age: 65
End: 2020-06-29

## 2020-06-29 NOTE — PROGRESS NOTES
Chart review completed 06/29/2020  Care Everywhere updates requested and reviewed.  Immunizations reconciled. Media reviewed.     Health Maintenance Due   Topic Date Due    Hepatitis C Screening  1955    HIV Screening  03/20/1971    TETANUS VACCINE  03/20/1974    Shingles Vaccine (1 of 2) 03/20/2006    Colorectal Cancer Screening  01/22/2019    Mammogram  07/26/2020

## 2020-07-13 ENCOUNTER — OFFICE VISIT (OUTPATIENT)
Dept: FAMILY MEDICINE | Facility: CLINIC | Age: 65
End: 2020-07-13
Payer: COMMERCIAL

## 2020-07-13 ENCOUNTER — LAB VISIT (OUTPATIENT)
Dept: LAB | Facility: HOSPITAL | Age: 65
End: 2020-07-13
Attending: FAMILY MEDICINE
Payer: COMMERCIAL

## 2020-07-13 VITALS
DIASTOLIC BLOOD PRESSURE: 78 MMHG | HEIGHT: 63 IN | BODY MASS INDEX: 31.89 KG/M2 | TEMPERATURE: 98 F | WEIGHT: 180 LBS | HEART RATE: 86 BPM | SYSTOLIC BLOOD PRESSURE: 116 MMHG | RESPIRATION RATE: 18 BRPM

## 2020-07-13 DIAGNOSIS — R53.83 FATIGUE, UNSPECIFIED TYPE: ICD-10-CM

## 2020-07-13 DIAGNOSIS — R53.83 FATIGUE, UNSPECIFIED TYPE: Primary | ICD-10-CM

## 2020-07-13 DIAGNOSIS — F32.A DEPRESSION, UNSPECIFIED DEPRESSION TYPE: ICD-10-CM

## 2020-07-13 PROCEDURE — 36415 COLL VENOUS BLD VENIPUNCTURE: CPT | Mod: PN

## 2020-07-13 PROCEDURE — 99214 OFFICE O/P EST MOD 30 MIN: CPT | Mod: S$GLB,,, | Performed by: FAMILY MEDICINE

## 2020-07-13 PROCEDURE — 82306 VITAMIN D 25 HYDROXY: CPT

## 2020-07-13 PROCEDURE — 3008F BODY MASS INDEX DOCD: CPT | Mod: CPTII,S$GLB,, | Performed by: FAMILY MEDICINE

## 2020-07-13 PROCEDURE — 82607 VITAMIN B-12: CPT

## 2020-07-13 PROCEDURE — 99999 PR PBB SHADOW E&M-EST. PATIENT-LVL IV: ICD-10-PCS | Mod: PBBFAC,,, | Performed by: FAMILY MEDICINE

## 2020-07-13 PROCEDURE — 3008F PR BODY MASS INDEX (BMI) DOCUMENTED: ICD-10-PCS | Mod: CPTII,S$GLB,, | Performed by: FAMILY MEDICINE

## 2020-07-13 PROCEDURE — 99214 PR OFFICE/OUTPT VISIT, EST, LEVL IV, 30-39 MIN: ICD-10-PCS | Mod: S$GLB,,, | Performed by: FAMILY MEDICINE

## 2020-07-13 PROCEDURE — 99999 PR PBB SHADOW E&M-EST. PATIENT-LVL IV: CPT | Mod: PBBFAC,,, | Performed by: FAMILY MEDICINE

## 2020-07-13 RX ORDER — IPRATROPIUM BROMIDE 42 UG/1
2 SPRAY, METERED NASAL DAILY PRN
COMMUNITY
Start: 2020-07-09 | End: 2020-11-03

## 2020-07-13 NOTE — PROGRESS NOTES
THIS DOCUMENT WAS MADE IN PART WITH VOICE RECOGNITION SOFTWARE.  OCCASIONALLY THIS SOFTWARE WILL MISINTERPRET WORDS OR PHRASES.      Fabricio Aiken Lamont  1955    Fabricio was seen today for depression.    Diagnoses and all orders for this visit:    Fatigue, unspecified type  -     Vitamin D; Future  -     Vitamin B12; Future  She does report previous deficiencies in B12 and D, currently not taking any supplements.    Depression, unspecified depression type      Depression somewhat improved and some improvement in motivation and energy.  Although she still complains of fatigue.  She may snore some, certainly not sleeping well although does take a 2 hour nap during the day which helps.  I mentioned the possibility of sleep apnea but she declined a sleep study at this time but will consider it.  We also discussed an increase in the dosage of the Pristiq but she would like more time since she feels continuing improvement.  So I will see her back in a few months but we can titrate up if needed in the meantime    Appointment duration greater than 25 min., more than 50% face-to-face counseling      Subjective     Chief Complaint   Patient presents with    Depression       HPI    Follow-up depression.  Patient was seen four weeks ago, not doing as well as previously on the Effexor and did report some ongoing side effects/sweating, etc..  So we switched to Pristiq.          Active Ambulatory Problems     Diagnosis Date Noted    Hyperlipidemia     Greater trochanteric bursitis 02/13/2012    Incontinence of urine- mild 03/26/2013    Hypothyroid 03/24/2014    Anxiety 09/12/2014    MVP (mitral valve prolapse) 06/28/2017     Resolved Ambulatory Problems     Diagnosis Date Noted    No Resolved Ambulatory Problems     Past Medical History:   Diagnosis Date    Abnormal Pap smear     Abnormal Pap smear of vagina     Breast disorder     Colon polyps          Review of Systems   Constitutional: Positive for fatigue.  "  Psychiatric/Behavioral: Positive for dysphoric mood (Improved). Negative for suicidal ideas.       Objective     Physical Exam  Vitals:    07/13/20 1329   BP: 116/78   BP Location: Left arm   Patient Position: Sitting   BP Method: Medium (Manual)   Pulse: 86   Resp: 18   Temp: 98.2 °F (36.8 °C)   TempSrc: Temporal   Weight: 81.7 kg (180 lb 0.1 oz)   Height: 5' 3" (1.6 m)       MOST RECENT LABS IN OUR ELECTRONIC MEDICAL RECORD:     Results for orders placed or performed in visit on 06/12/20   Lipid Panel   Result Value Ref Range    Cholesterol 262 (H) 120 - 199 mg/dL    Triglycerides 117 30 - 150 mg/dL    HDL 73 40 - 75 mg/dL    LDL Cholesterol 165.6 (H) 63.0 - 159.0 mg/dL    Hdl/Cholesterol Ratio 27.9 20.0 - 50.0 %    Total Cholesterol/HDL Ratio 3.6 2.0 - 5.0    Non-HDL Cholesterol 189 mg/dL   CBC auto differential   Result Value Ref Range    WBC 8.21 3.90 - 12.70 K/uL    RBC 5.01 4.00 - 5.40 M/uL    Hemoglobin 13.9 12.0 - 16.0 g/dL    Hematocrit 45.7 37.0 - 48.5 %    Mean Corpuscular Volume 91 82 - 98 fL    Mean Corpuscular Hemoglobin 27.7 27.0 - 31.0 pg    Mean Corpuscular Hemoglobin Conc 30.4 (L) 32.0 - 36.0 g/dL    RDW 13.2 11.5 - 14.5 %    Platelets 351 (H) 150 - 350 K/uL    MPV 9.5 9.2 - 12.9 fL    Immature Granulocytes 0.2 0.0 - 0.5 %    Gran # (ANC) 4.7 1.8 - 7.7 K/uL    Immature Grans (Abs) 0.02 0.00 - 0.04 K/uL    Lymph # 2.6 1.0 - 4.8 K/uL    Mono # 0.6 0.3 - 1.0 K/uL    Eos # 0.3 0.0 - 0.5 K/uL    Baso # 0.06 0.00 - 0.20 K/uL    nRBC 0 0 /100 WBC    Gran% 56.8 38.0 - 73.0 %    Lymph% 31.4 18.0 - 48.0 %    Mono% 7.2 4.0 - 15.0 %    Eosinophil% 3.7 0.0 - 8.0 %    Basophil% 0.7 0.0 - 1.9 %    Differential Method Automated    Comprehensive metabolic panel   Result Value Ref Range    Sodium 138 136 - 145 mmol/L    Potassium 4.7 3.5 - 5.1 mmol/L    Chloride 106 95 - 110 mmol/L    CO2 25 23 - 29 mmol/L    Glucose 80 70 - 110 mg/dL    BUN, Bld 20 8 - 23 mg/dL    Creatinine 0.7 0.5 - 1.4 mg/dL    Calcium 9.3 " 8.7 - 10.5 mg/dL    Total Protein 7.3 6.0 - 8.4 g/dL    Albumin 3.8 3.5 - 5.2 g/dL    Total Bilirubin 0.2 0.1 - 1.0 mg/dL    Alkaline Phosphatase 94 55 - 135 U/L    AST 18 10 - 40 U/L    ALT 16 10 - 44 U/L    Anion Gap 7 (L) 8 - 16 mmol/L    eGFR if African American >60.0 >60 mL/min/1.73 m^2    eGFR if non African American >60.0 >60 mL/min/1.73 m^2

## 2020-07-14 LAB
25(OH)D3+25(OH)D2 SERPL-MCNC: 29 NG/ML (ref 30–96)
VIT B12 SERPL-MCNC: 471 PG/ML (ref 210–950)

## 2020-07-17 ENCOUNTER — TELEPHONE (OUTPATIENT)
Dept: FAMILY MEDICINE | Facility: CLINIC | Age: 65
End: 2020-07-17

## 2020-07-17 NOTE — TELEPHONE ENCOUNTER
----- Message from Shari Wiggins sent at 7/17/2020 10:05 AM CDT -----  Regarding: results  Type:  Test Results    Who Called:  pt  Name of Test (Lab/Mammo/Etc):  lab  Date of Test:  07/13  Ordering Provider:  Dr Brown  Where the test was performed:  [facility]  Best Call Back Number:  [phone] (home)     Additional Information: pt states she can not get into the portal to review results

## 2020-07-17 NOTE — TELEPHONE ENCOUNTER
----- Message from Debbie Wang sent at 7/17/2020 11:32 AM CDT -----  Type:  Patient Returning Call    Who Called:  Fabricio  Who Left Message for Patient:  Bina  Does the patient know what this is regarding?:  results  Best Call Back Number:  [phone]  Additional Information:  thank you!

## 2020-07-24 ENCOUNTER — HOSPITAL ENCOUNTER (OUTPATIENT)
Dept: RADIOLOGY | Facility: HOSPITAL | Age: 65
Discharge: HOME | End: 2020-07-24
Attending: OTOLARYNGOLOGY
Payer: COMMERCIAL

## 2020-07-24 DIAGNOSIS — R13.19 CONSTANT LOW-GRADE DYSPHAGIA: ICD-10-CM

## 2020-07-24 PROCEDURE — A9698 NON-RAD CONTRAST MATERIALNOC: HCPCS | Mod: PO | Performed by: OTOLARYNGOLOGY

## 2020-07-24 PROCEDURE — 74220 X-RAY XM ESOPHAGUS 1CNTRST: CPT | Mod: 26,,, | Performed by: RADIOLOGY

## 2020-07-24 PROCEDURE — 74220 FL ESOPHAGRAM WITH BARIUM TABLET: ICD-10-PCS | Mod: 26,,, | Performed by: RADIOLOGY

## 2020-07-24 PROCEDURE — 74220 X-RAY XM ESOPHAGUS 1CNTRST: CPT | Mod: TC,PO

## 2020-07-24 PROCEDURE — 25500020 PHARM REV CODE 255: Mod: PO | Performed by: OTOLARYNGOLOGY

## 2020-07-24 RX ADMIN — Medication: at 09:07

## 2020-07-24 RX ADMIN — BARIUM SULFATE 355 ML: 0.6 SUSPENSION ORAL at 09:07

## 2020-08-05 ENCOUNTER — OFFICE VISIT (OUTPATIENT)
Dept: FAMILY MEDICINE | Facility: CLINIC | Age: 65
End: 2020-08-05
Payer: COMMERCIAL

## 2020-08-05 DIAGNOSIS — K22.5 ZENKER'S DIVERTICULUM: Primary | ICD-10-CM

## 2020-08-05 DIAGNOSIS — F32.A DEPRESSION, UNSPECIFIED DEPRESSION TYPE: ICD-10-CM

## 2020-08-05 PROCEDURE — 99213 PR OFFICE/OUTPT VISIT, EST, LEVL III, 20-29 MIN: ICD-10-PCS | Mod: 95,,, | Performed by: FAMILY MEDICINE

## 2020-08-05 PROCEDURE — 99213 OFFICE O/P EST LOW 20 MIN: CPT | Mod: 95,,, | Performed by: FAMILY MEDICINE

## 2020-08-05 RX ORDER — DESVENLAFAXINE 100 MG/1
100 TABLET, EXTENDED RELEASE ORAL DAILY
Qty: 30 TABLET | Refills: 5 | Status: SHIPPED | OUTPATIENT
Start: 2020-08-05 | End: 2020-11-03

## 2020-08-05 NOTE — PROGRESS NOTES
THIS DOCUMENT WAS MADE IN PART WITH VOICE RECOGNITION SOFTWARE.  OCCASIONALLY THIS SOFTWARE WILL MISINTERPRET WORDS OR PHRASES.      Fabricio Aiken Digna  1955    Diagnoses and all orders for this visit:    Zenker's diverticulum  I will reach out to the ENT department to see if this is something that they prefer to see and if not who they recommend.  I advised her I would get back to her hopefully within a day or so with the recommendation.  She was satisfied with this response and will wait for us to get back.    Depression, unspecified depression type  Increase the Pristiq.  Continue to monitor symptoms and let me know how she is doing in a few weeks.  Other orders  -     desvenlafaxine succinate (PRISTIQ) 100 MG Tb24; Take 1 tablet (100 mg total) by mouth once daily.        Subjective     The patient is being seen by virtual visit because of the COVID19 pandemic and necessity to reduce risk of exposure.  The patient location is:  Patient Home   The chief complaint leading to consultation is:  Possible ENT referral for a Zenker's diverticulum and follow-up depression lack of motivation and Pristiq    Visit type: Virtual visit with synchronous audio and video (if video was not established through Epic and [employer], then it was done through [employer] video collette, note that audio only visits are documented differently and specifically state audio only)  Total time spent with patient:  10 min  Each patient to whom he or she provides medical services by telemedicine is:  (1) informed of the relationship between the physician and patient and the respective role of any other health care provider with respect to management of the patient; and (2) notified that he or she may decline to receive medical services by telemedicine and may withdraw from such care at any time.      HPI    This is a 65-year-old female who has suspected that she had a Zenker's diverticulum for some time because of persistent symptoms of  regurgitation, fullness in the throat, etc..  She recently had a barium swallow that confirmed her suspicion.  She would like to discuss referral for surgical intervention.    Also with regard to treatment of depression and lack of motivation, she has had some improvement with the Pristiq but would like to increase because of failure of symptoms continue, no suicidal thoughts.    Active Ambulatory Problems     Diagnosis Date Noted    Hyperlipidemia     Greater trochanteric bursitis 02/13/2012    Incontinence of urine- mild 03/26/2013    Hypothyroid 03/24/2014    Anxiety 09/12/2014    MVP (mitral valve prolapse) 06/28/2017     Resolved Ambulatory Problems     Diagnosis Date Noted    No Resolved Ambulatory Problems     Past Medical History:   Diagnosis Date    Abnormal Pap smear     Abnormal Pap smear of vagina     Breast disorder     Colon polyps          Review of Systems   Constitutional: Negative for activity change and unexpected weight change.   HENT: Positive for trouble swallowing. Negative for hearing loss and rhinorrhea.    Eyes: Negative for discharge and visual disturbance.   Respiratory: Negative for chest tightness and wheezing.    Cardiovascular: Negative for chest pain and palpitations.   Gastrointestinal: Negative for blood in stool, constipation, diarrhea and vomiting.   Endocrine: Negative for polydipsia and polyuria.   Genitourinary: Negative for difficulty urinating, dysuria, hematuria and menstrual problem.   Musculoskeletal: Negative for arthralgias, joint swelling and neck pain.   Neurological: Negative for weakness and headaches.   Psychiatric/Behavioral: Positive for dysphoric mood. Negative for confusion.       Objective     Physical Exam  Constitutional:       General: She is not in acute distress.     Appearance: She is well-developed. She is not ill-appearing or toxic-appearing.   HENT:      Head: Normocephalic and atraumatic.   Eyes:      Conjunctiva/sclera: Conjunctivae  normal.   Pulmonary:      Effort: Pulmonary effort is normal. No tachypnea, accessory muscle usage or respiratory distress.      Breath sounds: No stridor.   Neurological:      Mental Status: She is alert and oriented to person, place, and time. She is not disoriented.   Psychiatric:         Speech: Speech is not rapid and pressured, delayed or slurred.       Physical exam limited as this was a virtual visit.  But it is apparent that the individual is in no acute distress, well groomed, well kept.  Speech was normal.  Patient is alert and oriented x3.  Mood and affect appear normal.      There were no vitals filed for this visit.

## 2020-08-06 ENCOUNTER — TELEPHONE (OUTPATIENT)
Dept: FAMILY MEDICINE | Facility: CLINIC | Age: 65
End: 2020-08-06

## 2020-08-06 DIAGNOSIS — K22.5 ZENKER'S DIVERTICULUM: Primary | ICD-10-CM

## 2020-08-06 NOTE — TELEPHONE ENCOUNTER
I spoke with patient, I placed an external referral to Dr. Medhat Vaughn , ENT, please print and fax the referral   also along with this please fax a copy of her recent barium swallow report.

## 2020-08-07 DIAGNOSIS — Z12.39 BREAST CANCER SCREENING: ICD-10-CM

## 2020-08-24 ENCOUNTER — LAB VISIT (OUTPATIENT)
Dept: FAMILY MEDICINE | Facility: CLINIC | Age: 65
End: 2020-08-24
Payer: COMMERCIAL

## 2020-08-24 ENCOUNTER — CLINICAL SUPPORT (OUTPATIENT)
Dept: CARDIOLOGY | Facility: CLINIC | Age: 65
End: 2020-08-24
Payer: COMMERCIAL

## 2020-08-24 DIAGNOSIS — Z01.812 PRE-OPERATIVE LABORATORY EXAMINATION: ICD-10-CM

## 2020-08-24 DIAGNOSIS — Z01.812 ENCOUNTER FOR PRE-OPERATIVE LABORATORY TESTING: ICD-10-CM

## 2020-08-24 PROCEDURE — 93000 EKG 12-LEAD: ICD-10-PCS | Mod: S$GLB,,, | Performed by: INTERNAL MEDICINE

## 2020-08-24 PROCEDURE — 93000 ELECTROCARDIOGRAM COMPLETE: CPT | Mod: S$GLB,,, | Performed by: INTERNAL MEDICINE

## 2020-08-24 PROCEDURE — U0003 INFECTIOUS AGENT DETECTION BY NUCLEIC ACID (DNA OR RNA); SEVERE ACUTE RESPIRATORY SYNDROME CORONAVIRUS 2 (SARS-COV-2) (CORONAVIRUS DISEASE [COVID-19]), AMPLIFIED PROBE TECHNIQUE, MAKING USE OF HIGH THROUGHPUT TECHNOLOGIES AS DESCRIBED BY CMS-2020-01-R: HCPCS

## 2020-08-25 LAB — SARS-COV-2 RNA RESP QL NAA+PROBE: NOT DETECTED

## 2020-08-31 ENCOUNTER — LAB VISIT (OUTPATIENT)
Dept: FAMILY MEDICINE | Facility: CLINIC | Age: 65
End: 2020-08-31
Payer: COMMERCIAL

## 2020-08-31 DIAGNOSIS — Z01.812 ENCOUNTER FOR PRE-OPERATIVE LABORATORY TESTING: ICD-10-CM

## 2020-08-31 PROCEDURE — U0003 INFECTIOUS AGENT DETECTION BY NUCLEIC ACID (DNA OR RNA); SEVERE ACUTE RESPIRATORY SYNDROME CORONAVIRUS 2 (SARS-COV-2) (CORONAVIRUS DISEASE [COVID-19]), AMPLIFIED PROBE TECHNIQUE, MAKING USE OF HIGH THROUGHPUT TECHNOLOGIES AS DESCRIBED BY CMS-2020-01-R: HCPCS

## 2020-09-01 LAB — SARS-COV-2 RNA RESP QL NAA+PROBE: NOT DETECTED

## 2020-09-05 PROBLEM — K22.5 ZENKER DIVERTICULUM: Status: ACTIVE | Noted: 2020-09-05

## 2020-10-05 ENCOUNTER — PATIENT MESSAGE (OUTPATIENT)
Dept: ADMINISTRATIVE | Facility: HOSPITAL | Age: 65
End: 2020-10-05

## 2020-11-03 ENCOUNTER — OFFICE VISIT (OUTPATIENT)
Dept: FAMILY MEDICINE | Facility: CLINIC | Age: 65
End: 2020-11-03
Payer: COMMERCIAL

## 2020-11-03 VITALS
SYSTOLIC BLOOD PRESSURE: 138 MMHG | HEART RATE: 81 BPM | DIASTOLIC BLOOD PRESSURE: 78 MMHG | BODY MASS INDEX: 32.66 KG/M2 | HEIGHT: 62 IN | TEMPERATURE: 98 F | WEIGHT: 177.5 LBS

## 2020-11-03 DIAGNOSIS — F41.9 ANXIETY: ICD-10-CM

## 2020-11-03 DIAGNOSIS — F32.A DEPRESSION, UNSPECIFIED DEPRESSION TYPE: Primary | ICD-10-CM

## 2020-11-03 PROCEDURE — 3008F BODY MASS INDEX DOCD: CPT | Mod: CPTII,S$GLB,, | Performed by: FAMILY MEDICINE

## 2020-11-03 PROCEDURE — 3008F PR BODY MASS INDEX (BMI) DOCUMENTED: ICD-10-PCS | Mod: CPTII,S$GLB,, | Performed by: FAMILY MEDICINE

## 2020-11-03 PROCEDURE — 99214 PR OFFICE/OUTPT VISIT, EST, LEVL IV, 30-39 MIN: ICD-10-PCS | Mod: S$GLB,,, | Performed by: FAMILY MEDICINE

## 2020-11-03 PROCEDURE — 99214 OFFICE O/P EST MOD 30 MIN: CPT | Mod: S$GLB,,, | Performed by: FAMILY MEDICINE

## 2020-11-03 PROCEDURE — 99999 PR PBB SHADOW E&M-EST. PATIENT-LVL III: CPT | Mod: PBBFAC,,, | Performed by: FAMILY MEDICINE

## 2020-11-03 PROCEDURE — 99999 PR PBB SHADOW E&M-EST. PATIENT-LVL III: ICD-10-PCS | Mod: PBBFAC,,, | Performed by: FAMILY MEDICINE

## 2020-11-03 RX ORDER — BUPROPION HYDROCHLORIDE 150 MG/1
150 TABLET ORAL DAILY
Qty: 30 TABLET | Refills: 5 | Status: SHIPPED | OUTPATIENT
Start: 2020-11-03 | End: 2021-08-17

## 2020-11-03 RX ORDER — DESVENLAFAXINE SUCCINATE 50 MG/1
50 TABLET, EXTENDED RELEASE ORAL DAILY
Qty: 30 TABLET | Refills: 5 | Status: SHIPPED | OUTPATIENT
Start: 2020-11-03 | End: 2020-12-30

## 2020-11-03 NOTE — PROGRESS NOTES
THIS DOCUMENT WAS MADE IN PART WITH VOICE RECOGNITION SOFTWARE.  OCCASIONALLY THIS SOFTWARE WILL MISINTERPRET WORDS OR PHRASES.      Fabricio Aiken [place]  1955    Fabricio was seen today for med change follow up.    Diagnoses and all orders for this visit:    Depression, unspecified depression type    Anxiety    Other orders  -     buPROPion (WELLBUTRIN XL) 150 MG TB24 tablet; Take 1 tablet (150 mg total) by mouth once daily.  -     desvenlafaxine succinate (PRISTIQ) 50 MG Tb24; Take 1 tablet (50 mg total) by mouth once daily.    Will will lower the dose of Pristiq, hopefully fewer side effects, combined with low-dose Wellbutrin.  Perhaps a combination will work better than either alone and may alleviate some of the side effects.  If not then we may need to switch to a different SSRI or SNRI    Follow in one month report any problems sooner    Appointment duration greater than 25 min.  Components of this time include chart review, lab review, radiology review, examination, documentation, consultation/specialist reports (when applicable), medication reconciliation, as well as discussion/counseling.  At least 50% of the time was spent face-to-face discussion/counseling.      Subjective     Chief Complaint   Patient presents with    Med Change Follow Up       HPI    Follow-up anxiety and depression.  In August we increased the dosage of her Pristiq to 100 mg.  Increase in sweating, not tolerable  In the past tried Effexor, was not as effective as Pristiq.  Also tried Wellbutrin, no notable side effects but did not seem to work.    Also underwent repair of a Zenker's diverticulum and is doing well.      Active Ambulatory Problems     Diagnosis Date Noted    Hyperlipidemia     Greater trochanteric bursitis 02/13/2012    Incontinence of urine- mild 03/26/2013    Hypothyroid 03/24/2014    Anxiety 09/12/2014    MVP (mitral valve prolapse) 06/28/2017    Zenker diverticulum 09/05/2020     Resolved Ambulatory Problems  "    Diagnosis Date Noted    No Resolved Ambulatory Problems     Past Medical History:   Diagnosis Date    Abnormal Pap smear     Abnormal Pap smear of vagina     Breast disorder     Colon polyps     GERD (gastroesophageal reflux disease)     History of pneumonia          Review of Systems  No trouble swallowing, no cough.  Some anxiety and depression mostly stress, related some family members who no sign of any suicidal thoughts, still working and functioning appropriately  Objective     Physical Exam  Vitals signs reviewed.   Constitutional:       General: She is not in acute distress.     Appearance: She is well-developed. She is not diaphoretic.   HENT:      Head: Normocephalic and atraumatic.   Eyes:      General: No scleral icterus.  Pulmonary:      Effort: Pulmonary effort is normal. No respiratory distress.   Neurological:      Mental Status: She is alert and oriented to person, place, and time.   Psychiatric:         Mood and Affect: Mood normal.         Behavior: Behavior normal.         Thought Content: Thought content normal.         Judgment: Judgment normal.       Vitals:    11/03/20 1101   BP: 138/78   BP Location: Right arm   Patient Position: Sitting   BP Method: Medium (Manual)   Pulse: 81   Temp: 98.2 °F (36.8 °C)   TempSrc: Skin   Weight: 80.5 kg (177 lb 7.5 oz)   Height: 5' 2" (1.575 m)       MOST RECENT LABS IN OUR ELECTRONIC MEDICAL RECORD:     Results for orders placed or performed during the hospital encounter of 09/05/20   CBC auto differential   Result Value Ref Range    WBC 12.44 3.90 - 12.70 K/uL    RBC 4.33 4.00 - 5.40 M/uL    Hemoglobin 12.4 12.0 - 16.0 g/dL    Hematocrit 39.8 37.0 - 48.5 %    MCV 92 82 - 98 fL    MCH 28.6 27.0 - 31.0 pg    MCHC 31.2 (L) 32.0 - 36.0 g/dL    RDW 13.1 11.5 - 14.5 %    Platelets 251 150 - 350 K/uL    MPV 8.9 (L) 9.2 - 12.9 fL    Immature Granulocytes 0.9 (H) 0.0 - 0.5 %    Gran # (ANC) 9.1 (H) 1.8 - 7.7 K/uL    Immature Grans (Abs) 0.11 (H) 0.00 - " 0.04 K/uL    Lymph # 2.1 1.0 - 4.8 K/uL    Mono # 1.0 0.3 - 1.0 K/uL    Eos # 0.0 0.0 - 0.5 K/uL    Baso # 0.04 0.00 - 0.20 K/uL    nRBC 0 0 /100 WBC    Gran % 73.4 (H) 38.0 - 73.0 %    Lymph % 17.0 (L) 18.0 - 48.0 %    Mono % 8.3 4.0 - 15.0 %    Eosinophil % 0.1 0.0 - 8.0 %    Basophil % 0.3 0.0 - 1.9 %    Differential Method Automated    Comprehensive metabolic panel   Result Value Ref Range    Sodium 138 136 - 145 mmol/L    Potassium 4.3 3.5 - 5.1 mmol/L    Chloride 106 95 - 110 mmol/L    CO2 29 22 - 31 mmol/L    Glucose 144 (H) 70 - 110 mg/dL    BUN 13 7 - 18 mg/dL    Creatinine 0.66 0.50 - 1.40 mg/dL    Calcium 8.5 8.4 - 10.2 mg/dL    Total Protein 6.6 6.0 - 8.4 g/dL    Albumin 3.6 3.5 - 5.2 g/dL    Total Bilirubin 0.6 0.2 - 1.3 mg/dL    Alkaline Phosphatase 53 38 - 145 U/L    AST 78 (H) 14 - 36 U/L    ALT 19 0 - 35 U/L    Anion Gap 3 (L) 8 - 16 mmol/L    eGFR if African American >60 >60 mL/min/1.73 m^2    eGFR if non African American >60 >60 mL/min/1.73 m^2

## 2020-11-24 ENCOUNTER — PATIENT OUTREACH (OUTPATIENT)
Dept: ADMINISTRATIVE | Facility: HOSPITAL | Age: 65
End: 2020-11-24

## 2020-11-24 NOTE — PROGRESS NOTES
Health Maintenance Due   Topic Date Due    Hepatitis C Screening  1955    HIV Screening  1971    TETANUS VACCINE  1974    Shingles Vaccine (1 of 2) 2006    Mammogram  2020    Influenza Vaccine (1) 2020       Chart review completed 2020.  Care Everywhere updates requested and reviewed.  Immunizations reconciled. Media reports reviewed.  Duplicate HM overrides and  orders removed.  Overdue HM topic chart audit and/or requested.  Overdue lab testing linked to upcoming lab appointments if applies.

## 2020-12-05 ENCOUNTER — OFFICE VISIT (OUTPATIENT)
Dept: URGENT CARE | Facility: CLINIC | Age: 65
End: 2020-12-05
Payer: COMMERCIAL

## 2020-12-05 VITALS
HEART RATE: 84 BPM | HEIGHT: 63 IN | RESPIRATION RATE: 18 BRPM | OXYGEN SATURATION: 96 % | WEIGHT: 177 LBS | BODY MASS INDEX: 31.36 KG/M2 | DIASTOLIC BLOOD PRESSURE: 89 MMHG | TEMPERATURE: 98 F | SYSTOLIC BLOOD PRESSURE: 132 MMHG

## 2020-12-05 DIAGNOSIS — R05.9 COUGH: Primary | ICD-10-CM

## 2020-12-05 LAB
CTP QC/QA: YES
SARS-COV-2 RDRP RESP QL NAA+PROBE: NEGATIVE

## 2020-12-05 PROCEDURE — 3008F BODY MASS INDEX DOCD: CPT | Mod: CPTII,S$GLB,, | Performed by: PHYSICIAN ASSISTANT

## 2020-12-05 PROCEDURE — 1125F AMNT PAIN NOTED PAIN PRSNT: CPT | Mod: S$GLB,,, | Performed by: PHYSICIAN ASSISTANT

## 2020-12-05 PROCEDURE — 99214 OFFICE O/P EST MOD 30 MIN: CPT | Mod: S$GLB,,, | Performed by: PHYSICIAN ASSISTANT

## 2020-12-05 PROCEDURE — U0002: ICD-10-PCS | Mod: QW,S$GLB,, | Performed by: PHYSICIAN ASSISTANT

## 2020-12-05 PROCEDURE — 1125F PR PAIN SEVERITY QUANTIFIED, PAIN PRESENT: ICD-10-PCS | Mod: S$GLB,,, | Performed by: PHYSICIAN ASSISTANT

## 2020-12-05 PROCEDURE — 99214 PR OFFICE/OUTPT VISIT, EST, LEVL IV, 30-39 MIN: ICD-10-PCS | Mod: S$GLB,,, | Performed by: PHYSICIAN ASSISTANT

## 2020-12-05 PROCEDURE — U0002 COVID-19 LAB TEST NON-CDC: HCPCS | Mod: QW,S$GLB,, | Performed by: PHYSICIAN ASSISTANT

## 2020-12-05 PROCEDURE — 3008F PR BODY MASS INDEX (BMI) DOCUMENTED: ICD-10-PCS | Mod: CPTII,S$GLB,, | Performed by: PHYSICIAN ASSISTANT

## 2020-12-05 RX ORDER — ALBUTEROL SULFATE 90 UG/1
2 AEROSOL, METERED RESPIRATORY (INHALATION) EVERY 6 HOURS PRN
Qty: 18 G | Refills: 0 | Status: SHIPPED | OUTPATIENT
Start: 2020-12-05 | End: 2021-08-17

## 2020-12-05 NOTE — PATIENT INSTRUCTIONS
"Your test was NEGATIVE for COVID-19 (coronavirus).      You may leave home and/or return to work when the following conditions are met:   24 hours fever free without fever-reducing medications AND   Improved symptoms   You have not met the conditions of a closed exposure       A "close exposure" is defined as anyone who has had an exposure (masked or unmasked) to a known COVID -19 positive person within 6 ft for longer than 15 minutes. If your exposure meets this definition you are required by CDC guidelines to quarantine for 14 days from time of exposure regardless of test status.      Additional instructions:  · Social distance per your local guidelines  · Call ahead before visiting your doctor.  · Wear a facemask when around others who do not live in your household.  · Cover your coughs and sneezes.  · Wash your hands often with soap and water; hand  can be used, too.      If your symptoms worsen or if you have any other concerns, please contact [facility] at [phone].     Sincerely,    MARTA Grullon  "

## 2020-12-05 NOTE — PROGRESS NOTES
"Subjective:       Patient ID: Fabricio Sawyer is a 65 y.o. female.    Vitals:  height is 5' 3" (1.6 m) and weight is 80.3 kg (177 lb). Her temporal temperature is 98.2 °F (36.8 °C). Her blood pressure is 132/89 and her pulse is 84. Her respiration is 18 and oxygen saturation is 96%.     Chief Complaint: Cough, Chest Pain (tightness), and Fatigue    Pt presents to clinic w/ cough, chest tightness and fatigue x 5 days      Constitution: Positive for fatigue. Negative for chills, fever and international travel in last 60 days.   HENT: Positive for postnasal drip. Negative for trouble swallowing.    Neck: Negative for neck pain.   Cardiovascular: Negative for chest trauma, chest pain, leg swelling, palpitations and passing out.   Respiratory: Positive for chest tightness. Negative for sleep apnea and shortness of breath.    Gastrointestinal: Negative for abdominal pain, nausea, vomiting and heartburn.   Genitourinary: Negative for history of kidney stones.   Musculoskeletal: Negative for back pain.   Skin: Negative for rash.   Neurological: Negative for light-headedness and passing out.   Hematologic/Lymphatic: Negative for history of blood clots.   Psychiatric/Behavioral: Negative for nervous/anxious. The patient is not nervous/anxious.        Objective:      Physical Exam   Constitutional: She is oriented to person, place, and time. She appears well-developed. She is cooperative.  Non-toxic appearance. She does not appear ill. No distress.   HENT:   Head: Normocephalic and atraumatic.   Ears:   Right Ear: Hearing and external ear normal.   Left Ear: Hearing and external ear normal.   Nose: No mucosal edema or nasal deformity. No epistaxis. Right sinus exhibits no maxillary sinus tenderness and no frontal sinus tenderness. Left sinus exhibits no maxillary sinus tenderness and no frontal sinus tenderness.   Mouth/Throat: Uvula is midline, oropharynx is clear and moist and mucous membranes are normal. No trismus in the " "jaw. Normal dentition. No uvula swelling. No posterior oropharyngeal edema.   Eyes: Conjunctivae and lids are normal. Right eye exhibits no discharge. Left eye exhibits no discharge. No scleral icterus.   Neck: Trachea normal, full passive range of motion without pain and phonation normal. Neck supple. No neck rigidity. No edema and no erythema present.   Cardiovascular: Normal rate, regular rhythm, normal heart sounds and normal pulses.   Pulmonary/Chest: Effort normal and breath sounds normal. No respiratory distress. She has no decreased breath sounds. She has no wheezes. She has no rhonchi. She has no rales.   Abdominal: Normal appearance.   Musculoskeletal: Normal range of motion.         General: No deformity.   Neurological: She is alert and oriented to person, place, and time. She exhibits normal muscle tone. Coordination normal.   Skin: Skin is warm, dry, intact, not diaphoretic and not pale. jaundicePsychiatric: Her speech is normal and behavior is normal. Mood, judgment and thought content normal.   Nursing note and vitals reviewed.        Assessment:       1. Cough        Plan:         Cough  -     POCT COVID-19 Rapid Screening    Results for orders placed or performed in visit on 12/05/20   POCT COVID-19 Rapid Screening   Result Value Ref Range    POC Rapid COVID Negative Negative     Acceptable Yes        Other orders  -     albuterol (VENTOLIN HFA) 90 mcg/actuation inhaler; Inhale 2 puffs into the lungs every 6 (six) hours as needed for Wheezing. Rescue  Dispense: 18 g; Refill: 0    Patient Instructions   Your test was NEGATIVE for COVID-19 (coronavirus).      You may leave home and/or return to work when the following conditions are met:   24 hours fever free without fever-reducing medications AND   Improved symptoms   You have not met the conditions of a closed exposure       A "close exposure" is defined as anyone who has had an exposure (masked or unmasked) to a known COVID -19 " positive person within 6 ft for longer than 15 minutes. If your exposure meets this definition you are required by CDC guidelines to quarantine for 14 days from time of exposure regardless of test status.      Additional instructions:  · Social distance per your local guidelines  · Call ahead before visiting your doctor.  · Wear a facemask when around others who do not live in your household.  · Cover your coughs and sneezes.  · Wash your hands often with soap and water; hand  can be used, too.      If your symptoms worsen or if you have any other concerns, please contact [facility] at [phone].     Sincerely,    MARTA Grullon

## 2020-12-30 ENCOUNTER — OFFICE VISIT (OUTPATIENT)
Dept: FAMILY MEDICINE | Facility: CLINIC | Age: 65
End: 2020-12-30
Payer: COMMERCIAL

## 2020-12-30 ENCOUNTER — HOSPITAL ENCOUNTER (OUTPATIENT)
Dept: RADIOLOGY | Facility: HOSPITAL | Age: 65
Discharge: HOME | End: 2020-12-30
Attending: FAMILY MEDICINE
Payer: COMMERCIAL

## 2020-12-30 VITALS
DIASTOLIC BLOOD PRESSURE: 82 MMHG | HEIGHT: 63 IN | WEIGHT: 180.13 LBS | BODY MASS INDEX: 31.92 KG/M2 | TEMPERATURE: 99 F | HEART RATE: 77 BPM | SYSTOLIC BLOOD PRESSURE: 138 MMHG | OXYGEN SATURATION: 96 %

## 2020-12-30 DIAGNOSIS — F32.A DEPRESSION, UNSPECIFIED DEPRESSION TYPE: Primary | ICD-10-CM

## 2020-12-30 DIAGNOSIS — M25.552 LEFT HIP PAIN: ICD-10-CM

## 2020-12-30 DIAGNOSIS — F41.9 ANXIETY: ICD-10-CM

## 2020-12-30 PROCEDURE — 73502 X-RAY EXAM HIP UNI 2-3 VIEWS: CPT | Mod: 26,LT,, | Performed by: RADIOLOGY

## 2020-12-30 PROCEDURE — 73502 X-RAY EXAM HIP UNI 2-3 VIEWS: CPT | Mod: TC,PN,LT

## 2020-12-30 PROCEDURE — 3008F BODY MASS INDEX DOCD: CPT | Mod: CPTII,S$GLB,, | Performed by: FAMILY MEDICINE

## 2020-12-30 PROCEDURE — 99214 PR OFFICE/OUTPT VISIT, EST, LEVL IV, 30-39 MIN: ICD-10-PCS | Mod: S$GLB,,, | Performed by: FAMILY MEDICINE

## 2020-12-30 PROCEDURE — 99999 PR PBB SHADOW E&M-EST. PATIENT-LVL III: ICD-10-PCS | Mod: PBBFAC,,, | Performed by: FAMILY MEDICINE

## 2020-12-30 PROCEDURE — 99214 OFFICE O/P EST MOD 30 MIN: CPT | Mod: S$GLB,,, | Performed by: FAMILY MEDICINE

## 2020-12-30 PROCEDURE — 73502 XR HIP 2 VIEW LEFT: ICD-10-PCS | Mod: 26,LT,, | Performed by: RADIOLOGY

## 2020-12-30 PROCEDURE — 3008F PR BODY MASS INDEX (BMI) DOCUMENTED: ICD-10-PCS | Mod: CPTII,S$GLB,, | Performed by: FAMILY MEDICINE

## 2020-12-30 PROCEDURE — 99999 PR PBB SHADOW E&M-EST. PATIENT-LVL III: CPT | Mod: PBBFAC,,, | Performed by: FAMILY MEDICINE

## 2020-12-30 RX ORDER — SERTRALINE HYDROCHLORIDE 50 MG/1
50 TABLET, FILM COATED ORAL DAILY
Qty: 30 TABLET | Refills: 3 | Status: SHIPPED | OUTPATIENT
Start: 2020-12-30 | End: 2021-04-28

## 2020-12-30 NOTE — PROGRESS NOTES
THIS DOCUMENT WAS MADE IN PART WITH VOICE RECOGNITION SOFTWARE.  OCCASIONALLY THIS SOFTWARE WILL MISINTERPRET WORDS OR PHRASES.      Fabricio Aiken Digna  1955    Fabricio was seen today for med change.    Diagnoses and all orders for this visit:    Depression, unspecified depression type  Anxiety  Discontinue Pristiq.  Switch to an SSRI, Zoloft, at a low dose to see if she has less difficulty with side effects.  We can titrate up depending on efficacy.  I would recommend continuing the Wellbutrin for now just not to make too many changes at once, re-evaluate in a few months sooner if any difficulties      Left hip pain  -     X-Ray Hip 2 View Left; Future  She will consider Orthopedics and I am happy to place a referral if she wishes    Other orders  -     sertraline (ZOLOFT) 50 MG tablet; Take 1 tablet (50 mg total) by mouth once daily.        Subjective     Chief Complaint   Patient presents with    Med Change       HPI    Follow-up anxiety and depression.  See recent medication changes.  Patient reports she is still having difficulty with sweating and wants to get off all medications.  Reviewed previous medications, at one point she was on Zoloft many years ago does not know why she stopped it, similar with Lexapro.  Most recently was on Effexor which was not working as well therefore we switch to Pristiq.  We did start back Wellbutrin but she does not really notice any different from this although she does appear to be a little better but still having side effects.    Left hip pain, anterior and lateral does radiate sometimes down the anterior lateral thigh to the knee affected by walking.  No lower back pain no weakness no footdrop no numbness.      Active Ambulatory Problems     Diagnosis Date Noted    Hyperlipidemia     Greater trochanteric bursitis 02/13/2012    Incontinence of urine- mild 03/26/2013    Hypothyroid 03/24/2014    Anxiety 09/12/2014    MVP (mitral valve prolapse) 06/28/2017    Zenker  "diverticulum 09/05/2020     Resolved Ambulatory Problems     Diagnosis Date Noted    No Resolved Ambulatory Problems     Past Medical History:   Diagnosis Date    Abnormal Pap smear     Abnormal Pap smear of vagina     Breast disorder     Colon polyps     GERD (gastroesophageal reflux disease)     History of pneumonia          Review of Systems   Constitutional: Positive for diaphoresis.   Musculoskeletal: Positive for arthralgias and back pain.   Psychiatric/Behavioral: Positive for dysphoric mood. Negative for sleep disturbance and suicidal ideas. The patient is nervous/anxious.        Objective     Physical Exam  Vitals signs reviewed.   Constitutional:       General: She is not in acute distress.     Appearance: She is well-developed. She is not diaphoretic.   HENT:      Head: Normocephalic and atraumatic.   Eyes:      General: No scleral icterus.  Pulmonary:      Effort: Pulmonary effort is normal. No respiratory distress.   Neurological:      Mental Status: She is alert and oriented to person, place, and time.      Comments: Antalgic gait   Psychiatric:         Mood and Affect: Mood normal.         Behavior: Behavior normal.         Thought Content: Thought content normal.         Judgment: Judgment normal.       Vitals:    12/30/20 0755   BP: 138/82   BP Location: Left arm   Patient Position: Sitting   BP Method: Medium (Manual)   Pulse: 77   Temp: 98.6 °F (37 °C)   TempSrc: Skin   SpO2: 96%   Weight: 81.7 kg (180 lb 1.9 oz)   Height: 5' 3" (1.6 m)       MOST RECENT LABS IN OUR ELECTRONIC MEDICAL RECORD:     Results for orders placed or performed in visit on 12/05/20   POCT COVID-19 Rapid Screening   Result Value Ref Range    POC Rapid COVID Negative Negative     Acceptable Yes          "

## 2021-01-04 ENCOUNTER — PATIENT MESSAGE (OUTPATIENT)
Dept: ADMINISTRATIVE | Facility: HOSPITAL | Age: 66
End: 2021-01-04

## 2021-04-05 ENCOUNTER — PATIENT MESSAGE (OUTPATIENT)
Dept: RADIOLOGY | Facility: HOSPITAL | Age: 66
End: 2021-04-05

## 2021-04-06 ENCOUNTER — PATIENT MESSAGE (OUTPATIENT)
Dept: ADMINISTRATIVE | Facility: HOSPITAL | Age: 66
End: 2021-04-06

## 2021-05-06 ENCOUNTER — PATIENT MESSAGE (OUTPATIENT)
Dept: RESEARCH | Facility: HOSPITAL | Age: 66
End: 2021-05-06

## 2021-07-07 RX ORDER — RIZATRIPTAN BENZOATE 10 MG/1
TABLET, ORALLY DISINTEGRATING ORAL
Qty: 9 TABLET | Refills: 1 | Status: SHIPPED | OUTPATIENT
Start: 2021-07-07 | End: 2021-12-06

## 2021-07-09 ENCOUNTER — OFFICE VISIT (OUTPATIENT)
Dept: URGENT CARE | Facility: CLINIC | Age: 66
End: 2021-07-09
Payer: COMMERCIAL

## 2021-07-09 VITALS
WEIGHT: 180 LBS | TEMPERATURE: 98 F | HEART RATE: 85 BPM | HEIGHT: 63 IN | SYSTOLIC BLOOD PRESSURE: 135 MMHG | BODY MASS INDEX: 31.89 KG/M2 | DIASTOLIC BLOOD PRESSURE: 69 MMHG | RESPIRATION RATE: 12 BRPM | OXYGEN SATURATION: 97 %

## 2021-07-09 DIAGNOSIS — G44.201 ACUTE INTRACTABLE TENSION-TYPE HEADACHE: ICD-10-CM

## 2021-07-09 DIAGNOSIS — R53.83 FATIGUE, UNSPECIFIED TYPE: Primary | ICD-10-CM

## 2021-07-09 DIAGNOSIS — S16.1XXA CERVICAL MUSCLE STRAIN, INITIAL ENCOUNTER: ICD-10-CM

## 2021-07-09 LAB
CTP QC/QA: YES
SARS-COV-2 RDRP RESP QL NAA+PROBE: NEGATIVE

## 2021-07-09 PROCEDURE — 99214 PR OFFICE/OUTPT VISIT, EST, LEVL IV, 30-39 MIN: ICD-10-PCS | Mod: 25,S$GLB,, | Performed by: PHYSICIAN ASSISTANT

## 2021-07-09 PROCEDURE — 3008F PR BODY MASS INDEX (BMI) DOCUMENTED: ICD-10-PCS | Mod: CPTII,S$GLB,, | Performed by: PHYSICIAN ASSISTANT

## 2021-07-09 PROCEDURE — U0002: ICD-10-PCS | Mod: QW,S$GLB,, | Performed by: PHYSICIAN ASSISTANT

## 2021-07-09 PROCEDURE — 96372 PR INJECTION,THERAP/PROPH/DIAG2ST, IM OR SUBCUT: ICD-10-PCS | Mod: S$GLB,,, | Performed by: PHYSICIAN ASSISTANT

## 2021-07-09 PROCEDURE — 96372 THER/PROPH/DIAG INJ SC/IM: CPT | Mod: S$GLB,,, | Performed by: PHYSICIAN ASSISTANT

## 2021-07-09 PROCEDURE — 3008F BODY MASS INDEX DOCD: CPT | Mod: CPTII,S$GLB,, | Performed by: PHYSICIAN ASSISTANT

## 2021-07-09 PROCEDURE — 99214 OFFICE O/P EST MOD 30 MIN: CPT | Mod: 25,S$GLB,, | Performed by: PHYSICIAN ASSISTANT

## 2021-07-09 PROCEDURE — U0002 COVID-19 LAB TEST NON-CDC: HCPCS | Mod: QW,S$GLB,, | Performed by: PHYSICIAN ASSISTANT

## 2021-07-09 RX ORDER — KETOROLAC TROMETHAMINE 30 MG/ML
30 INJECTION, SOLUTION INTRAMUSCULAR; INTRAVENOUS
Status: COMPLETED | OUTPATIENT
Start: 2021-07-09 | End: 2021-07-09

## 2021-07-09 RX ADMIN — KETOROLAC TROMETHAMINE 30 MG: 30 INJECTION, SOLUTION INTRAMUSCULAR; INTRAVENOUS at 02:07

## 2021-07-12 ENCOUNTER — TELEPHONE (OUTPATIENT)
Dept: PODIATRY | Facility: CLINIC | Age: 66
End: 2021-07-12

## 2021-07-20 ENCOUNTER — OFFICE VISIT (OUTPATIENT)
Dept: FAMILY MEDICINE | Facility: CLINIC | Age: 66
End: 2021-07-20
Payer: COMMERCIAL

## 2021-07-20 VITALS
BODY MASS INDEX: 32.02 KG/M2 | DIASTOLIC BLOOD PRESSURE: 80 MMHG | TEMPERATURE: 98 F | WEIGHT: 180.75 LBS | OXYGEN SATURATION: 96 % | SYSTOLIC BLOOD PRESSURE: 128 MMHG | HEART RATE: 96 BPM

## 2021-07-20 DIAGNOSIS — S16.1XXA STRAIN OF NECK MUSCLE, INITIAL ENCOUNTER: Primary | ICD-10-CM

## 2021-07-20 DIAGNOSIS — G44.209 TENSION HEADACHE: ICD-10-CM

## 2021-07-20 PROCEDURE — 1125F AMNT PAIN NOTED PAIN PRSNT: CPT | Mod: CPTII,S$GLB,, | Performed by: PHYSICIAN ASSISTANT

## 2021-07-20 PROCEDURE — 99214 OFFICE O/P EST MOD 30 MIN: CPT | Mod: S$GLB,,, | Performed by: PHYSICIAN ASSISTANT

## 2021-07-20 PROCEDURE — 1101F PT FALLS ASSESS-DOCD LE1/YR: CPT | Mod: CPTII,S$GLB,, | Performed by: PHYSICIAN ASSISTANT

## 2021-07-20 PROCEDURE — 99999 PR PBB SHADOW E&M-EST. PATIENT-LVL III: ICD-10-PCS | Mod: PBBFAC,,, | Performed by: PHYSICIAN ASSISTANT

## 2021-07-20 PROCEDURE — 3288F PR FALLS RISK ASSESSMENT DOCUMENTED: ICD-10-PCS | Mod: CPTII,S$GLB,, | Performed by: PHYSICIAN ASSISTANT

## 2021-07-20 PROCEDURE — 99214 PR OFFICE/OUTPT VISIT, EST, LEVL IV, 30-39 MIN: ICD-10-PCS | Mod: S$GLB,,, | Performed by: PHYSICIAN ASSISTANT

## 2021-07-20 PROCEDURE — 3008F PR BODY MASS INDEX (BMI) DOCUMENTED: ICD-10-PCS | Mod: CPTII,S$GLB,, | Performed by: PHYSICIAN ASSISTANT

## 2021-07-20 PROCEDURE — 3288F FALL RISK ASSESSMENT DOCD: CPT | Mod: CPTII,S$GLB,, | Performed by: PHYSICIAN ASSISTANT

## 2021-07-20 PROCEDURE — 3008F BODY MASS INDEX DOCD: CPT | Mod: CPTII,S$GLB,, | Performed by: PHYSICIAN ASSISTANT

## 2021-07-20 PROCEDURE — 1125F PR PAIN SEVERITY QUANTIFIED, PAIN PRESENT: ICD-10-PCS | Mod: CPTII,S$GLB,, | Performed by: PHYSICIAN ASSISTANT

## 2021-07-20 PROCEDURE — 99999 PR PBB SHADOW E&M-EST. PATIENT-LVL III: CPT | Mod: PBBFAC,,, | Performed by: PHYSICIAN ASSISTANT

## 2021-07-20 PROCEDURE — 1101F PR PT FALLS ASSESS DOC 0-1 FALLS W/OUT INJ PAST YR: ICD-10-PCS | Mod: CPTII,S$GLB,, | Performed by: PHYSICIAN ASSISTANT

## 2021-07-20 RX ORDER — NAPROXEN 500 MG/1
500 TABLET ORAL 2 TIMES DAILY WITH MEALS
Qty: 28 TABLET | Refills: 0 | Status: SHIPPED | OUTPATIENT
Start: 2021-07-20 | End: 2021-08-03

## 2021-07-20 RX ORDER — TIZANIDINE 4 MG/1
4 TABLET ORAL NIGHTLY
Qty: 14 TABLET | Refills: 0 | Status: SHIPPED | OUTPATIENT
Start: 2021-07-20 | End: 2021-08-03

## 2021-08-02 ENCOUNTER — PATIENT OUTREACH (OUTPATIENT)
Dept: ADMINISTRATIVE | Facility: OTHER | Age: 66
End: 2021-08-02

## 2021-08-04 ENCOUNTER — OFFICE VISIT (OUTPATIENT)
Dept: PODIATRY | Facility: CLINIC | Age: 66
End: 2021-08-04
Payer: COMMERCIAL

## 2021-08-04 VITALS — HEART RATE: 86 BPM | SYSTOLIC BLOOD PRESSURE: 147 MMHG | DIASTOLIC BLOOD PRESSURE: 81 MMHG

## 2021-08-04 DIAGNOSIS — B35.1 ONYCHOMYCOSIS DUE TO DERMATOPHYTE: Primary | ICD-10-CM

## 2021-08-04 PROCEDURE — 1159F MED LIST DOCD IN RCRD: CPT | Mod: CPTII,S$GLB,, | Performed by: PODIATRIST

## 2021-08-04 PROCEDURE — 99203 OFFICE O/P NEW LOW 30 MIN: CPT | Mod: S$GLB,,, | Performed by: PODIATRIST

## 2021-08-04 PROCEDURE — 99203 PR OFFICE/OUTPT VISIT, NEW, LEVL III, 30-44 MIN: ICD-10-PCS | Mod: S$GLB,,, | Performed by: PODIATRIST

## 2021-08-04 PROCEDURE — 1101F PT FALLS ASSESS-DOCD LE1/YR: CPT | Mod: CPTII,S$GLB,, | Performed by: PODIATRIST

## 2021-08-04 PROCEDURE — 3079F PR MOST RECENT DIASTOLIC BLOOD PRESSURE 80-89 MM HG: ICD-10-PCS | Mod: CPTII,S$GLB,, | Performed by: PODIATRIST

## 2021-08-04 PROCEDURE — 3077F PR MOST RECENT SYSTOLIC BLOOD PRESSURE >= 140 MM HG: ICD-10-PCS | Mod: CPTII,S$GLB,, | Performed by: PODIATRIST

## 2021-08-04 PROCEDURE — 1126F AMNT PAIN NOTED NONE PRSNT: CPT | Mod: CPTII,S$GLB,, | Performed by: PODIATRIST

## 2021-08-04 PROCEDURE — 99999 PR PBB SHADOW E&M-EST. PATIENT-LVL III: CPT | Mod: PBBFAC,,, | Performed by: PODIATRIST

## 2021-08-04 PROCEDURE — 3288F FALL RISK ASSESSMENT DOCD: CPT | Mod: CPTII,S$GLB,, | Performed by: PODIATRIST

## 2021-08-04 PROCEDURE — 3077F SYST BP >= 140 MM HG: CPT | Mod: CPTII,S$GLB,, | Performed by: PODIATRIST

## 2021-08-04 PROCEDURE — 1101F PR PT FALLS ASSESS DOC 0-1 FALLS W/OUT INJ PAST YR: ICD-10-PCS | Mod: CPTII,S$GLB,, | Performed by: PODIATRIST

## 2021-08-04 PROCEDURE — 3079F DIAST BP 80-89 MM HG: CPT | Mod: CPTII,S$GLB,, | Performed by: PODIATRIST

## 2021-08-04 PROCEDURE — 99999 PR PBB SHADOW E&M-EST. PATIENT-LVL III: ICD-10-PCS | Mod: PBBFAC,,, | Performed by: PODIATRIST

## 2021-08-04 PROCEDURE — 1159F PR MEDICATION LIST DOCUMENTED IN MEDICAL RECORD: ICD-10-PCS | Mod: CPTII,S$GLB,, | Performed by: PODIATRIST

## 2021-08-04 PROCEDURE — 3288F PR FALLS RISK ASSESSMENT DOCUMENTED: ICD-10-PCS | Mod: CPTII,S$GLB,, | Performed by: PODIATRIST

## 2021-08-04 PROCEDURE — 1160F PR REVIEW ALL MEDS BY PRESCRIBER/CLIN PHARMACIST DOCUMENTED: ICD-10-PCS | Mod: CPTII,S$GLB,, | Performed by: PODIATRIST

## 2021-08-04 PROCEDURE — 1160F RVW MEDS BY RX/DR IN RCRD: CPT | Mod: CPTII,S$GLB,, | Performed by: PODIATRIST

## 2021-08-04 PROCEDURE — 1126F PR PAIN SEVERITY QUANTIFIED, NO PAIN PRESENT: ICD-10-PCS | Mod: CPTII,S$GLB,, | Performed by: PODIATRIST

## 2021-08-05 ENCOUNTER — LAB VISIT (OUTPATIENT)
Dept: LAB | Facility: HOSPITAL | Age: 66
End: 2021-08-05
Attending: PODIATRIST
Payer: COMMERCIAL

## 2021-08-05 DIAGNOSIS — B35.1 ONYCHOMYCOSIS DUE TO DERMATOPHYTE: ICD-10-CM

## 2021-08-05 LAB
ALBUMIN SERPL BCP-MCNC: 3.7 G/DL (ref 3.5–5.2)
ALP SERPL-CCNC: 75 U/L (ref 55–135)
ALT SERPL W/O P-5'-P-CCNC: 13 U/L (ref 10–44)
AST SERPL-CCNC: 17 U/L (ref 10–40)
BILIRUB DIRECT SERPL-MCNC: 0.1 MG/DL (ref 0.1–0.3)
BILIRUB SERPL-MCNC: 0.2 MG/DL (ref 0.1–1)
PROT SERPL-MCNC: 6.7 G/DL (ref 6–8.4)

## 2021-08-05 PROCEDURE — 36415 COLL VENOUS BLD VENIPUNCTURE: CPT | Mod: PN | Performed by: PODIATRIST

## 2021-08-05 PROCEDURE — 80076 HEPATIC FUNCTION PANEL: CPT | Performed by: PODIATRIST

## 2021-08-06 DIAGNOSIS — B35.1 ONYCHOMYCOSIS DUE TO DERMATOPHYTE: Primary | ICD-10-CM

## 2021-08-06 DIAGNOSIS — Z51.81 ENCOUNTER FOR MEDICATION MONITORING: ICD-10-CM

## 2021-08-10 ENCOUNTER — PATIENT MESSAGE (OUTPATIENT)
Dept: PODIATRY | Facility: CLINIC | Age: 66
End: 2021-08-10

## 2021-08-12 ENCOUNTER — PATIENT MESSAGE (OUTPATIENT)
Dept: PODIATRY | Facility: CLINIC | Age: 66
End: 2021-08-12

## 2021-08-17 ENCOUNTER — OFFICE VISIT (OUTPATIENT)
Dept: FAMILY MEDICINE | Facility: CLINIC | Age: 66
End: 2021-08-17
Payer: COMMERCIAL

## 2021-08-17 VITALS
BODY MASS INDEX: 31.5 KG/M2 | HEIGHT: 63 IN | OXYGEN SATURATION: 96 % | TEMPERATURE: 99 F | DIASTOLIC BLOOD PRESSURE: 86 MMHG | HEART RATE: 87 BPM | WEIGHT: 177.81 LBS | SYSTOLIC BLOOD PRESSURE: 138 MMHG

## 2021-08-17 DIAGNOSIS — M79.641 PAIN IN BOTH HANDS: ICD-10-CM

## 2021-08-17 DIAGNOSIS — Z23 IMMUNIZATION DUE: ICD-10-CM

## 2021-08-17 DIAGNOSIS — M79.642 PAIN IN BOTH HANDS: ICD-10-CM

## 2021-08-17 DIAGNOSIS — E03.9 HYPOTHYROIDISM, UNSPECIFIED TYPE: ICD-10-CM

## 2021-08-17 DIAGNOSIS — Z00.00 WELLNESS EXAMINATION: Primary | ICD-10-CM

## 2021-08-17 DIAGNOSIS — Z78.0 POSTMENOPAUSAL: ICD-10-CM

## 2021-08-17 PROCEDURE — 1159F PR MEDICATION LIST DOCUMENTED IN MEDICAL RECORD: ICD-10-PCS | Mod: CPTII,S$GLB,, | Performed by: FAMILY MEDICINE

## 2021-08-17 PROCEDURE — 1126F PR PAIN SEVERITY QUANTIFIED, NO PAIN PRESENT: ICD-10-PCS | Mod: CPTII,S$GLB,, | Performed by: FAMILY MEDICINE

## 2021-08-17 PROCEDURE — 3079F DIAST BP 80-89 MM HG: CPT | Mod: CPTII,S$GLB,, | Performed by: FAMILY MEDICINE

## 2021-08-17 PROCEDURE — 1101F PT FALLS ASSESS-DOCD LE1/YR: CPT | Mod: CPTII,S$GLB,, | Performed by: FAMILY MEDICINE

## 2021-08-17 PROCEDURE — 3288F PR FALLS RISK ASSESSMENT DOCUMENTED: ICD-10-PCS | Mod: CPTII,S$GLB,, | Performed by: FAMILY MEDICINE

## 2021-08-17 PROCEDURE — 3075F PR MOST RECENT SYSTOLIC BLOOD PRESS GE 130-139MM HG: ICD-10-PCS | Mod: CPTII,S$GLB,, | Performed by: FAMILY MEDICINE

## 2021-08-17 PROCEDURE — 1160F RVW MEDS BY RX/DR IN RCRD: CPT | Mod: CPTII,S$GLB,, | Performed by: FAMILY MEDICINE

## 2021-08-17 PROCEDURE — 99999 PR PBB SHADOW E&M-EST. PATIENT-LVL IV: ICD-10-PCS | Mod: PBBFAC,,, | Performed by: FAMILY MEDICINE

## 2021-08-17 PROCEDURE — 3075F SYST BP GE 130 - 139MM HG: CPT | Mod: CPTII,S$GLB,, | Performed by: FAMILY MEDICINE

## 2021-08-17 PROCEDURE — 1126F AMNT PAIN NOTED NONE PRSNT: CPT | Mod: CPTII,S$GLB,, | Performed by: FAMILY MEDICINE

## 2021-08-17 PROCEDURE — 99999 PR PBB SHADOW E&M-EST. PATIENT-LVL IV: CPT | Mod: PBBFAC,,, | Performed by: FAMILY MEDICINE

## 2021-08-17 PROCEDURE — 3288F FALL RISK ASSESSMENT DOCD: CPT | Mod: CPTII,S$GLB,, | Performed by: FAMILY MEDICINE

## 2021-08-17 PROCEDURE — 1101F PR PT FALLS ASSESS DOC 0-1 FALLS W/OUT INJ PAST YR: ICD-10-PCS | Mod: CPTII,S$GLB,, | Performed by: FAMILY MEDICINE

## 2021-08-17 PROCEDURE — 99397 PR PREVENTIVE VISIT,EST,65 & OVER: ICD-10-PCS | Mod: S$GLB,,, | Performed by: FAMILY MEDICINE

## 2021-08-17 PROCEDURE — 99397 PER PM REEVAL EST PAT 65+ YR: CPT | Mod: S$GLB,,, | Performed by: FAMILY MEDICINE

## 2021-08-17 PROCEDURE — 3008F BODY MASS INDEX DOCD: CPT | Mod: CPTII,S$GLB,, | Performed by: FAMILY MEDICINE

## 2021-08-17 PROCEDURE — 3079F PR MOST RECENT DIASTOLIC BLOOD PRESSURE 80-89 MM HG: ICD-10-PCS | Mod: CPTII,S$GLB,, | Performed by: FAMILY MEDICINE

## 2021-08-17 PROCEDURE — 1160F PR REVIEW ALL MEDS BY PRESCRIBER/CLIN PHARMACIST DOCUMENTED: ICD-10-PCS | Mod: CPTII,S$GLB,, | Performed by: FAMILY MEDICINE

## 2021-08-17 PROCEDURE — 3008F PR BODY MASS INDEX (BMI) DOCUMENTED: ICD-10-PCS | Mod: CPTII,S$GLB,, | Performed by: FAMILY MEDICINE

## 2021-08-17 PROCEDURE — 1159F MED LIST DOCD IN RCRD: CPT | Mod: CPTII,S$GLB,, | Performed by: FAMILY MEDICINE

## 2021-08-17 RX ORDER — THYROID 120 MG/1
120 TABLET ORAL DAILY
Qty: 30 TABLET | Refills: 5 | Status: SHIPPED | OUTPATIENT
Start: 2021-08-17 | End: 2022-02-02

## 2021-08-23 ENCOUNTER — IMMUNIZATION (OUTPATIENT)
Dept: PHARMACY | Facility: CLINIC | Age: 66
End: 2021-08-23
Payer: COMMERCIAL

## 2021-08-23 ENCOUNTER — OFFICE VISIT (OUTPATIENT)
Dept: PODIATRY | Facility: CLINIC | Age: 66
End: 2021-08-23
Payer: COMMERCIAL

## 2021-08-23 VITALS — BODY MASS INDEX: 31.53 KG/M2 | WEIGHT: 177.94 LBS | HEIGHT: 63 IN

## 2021-08-23 DIAGNOSIS — B35.1 ONYCHOMYCOSIS DUE TO DERMATOPHYTE: Primary | ICD-10-CM

## 2021-08-23 DIAGNOSIS — Z23 NEED FOR VACCINATION: Primary | ICD-10-CM

## 2021-08-23 PROCEDURE — 11730 NAIL REMOVAL: ICD-10-PCS | Mod: T6,S$GLB,, | Performed by: PODIATRIST

## 2021-08-23 PROCEDURE — 1101F PT FALLS ASSESS-DOCD LE1/YR: CPT | Mod: CPTII,S$GLB,, | Performed by: PODIATRIST

## 2021-08-23 PROCEDURE — 3288F PR FALLS RISK ASSESSMENT DOCUMENTED: ICD-10-PCS | Mod: CPTII,S$GLB,, | Performed by: PODIATRIST

## 2021-08-23 PROCEDURE — 1126F PR PAIN SEVERITY QUANTIFIED, NO PAIN PRESENT: ICD-10-PCS | Mod: CPTII,S$GLB,, | Performed by: PODIATRIST

## 2021-08-23 PROCEDURE — 99999 PR PBB SHADOW E&M-EST. PATIENT-LVL III: CPT | Mod: PBBFAC,,, | Performed by: PODIATRIST

## 2021-08-23 PROCEDURE — 3008F BODY MASS INDEX DOCD: CPT | Mod: CPTII,S$GLB,, | Performed by: PODIATRIST

## 2021-08-23 PROCEDURE — 3288F FALL RISK ASSESSMENT DOCD: CPT | Mod: CPTII,S$GLB,, | Performed by: PODIATRIST

## 2021-08-23 PROCEDURE — 99213 PR OFFICE/OUTPT VISIT, EST, LEVL III, 20-29 MIN: ICD-10-PCS | Mod: 25,S$GLB,, | Performed by: PODIATRIST

## 2021-08-23 PROCEDURE — 1159F PR MEDICATION LIST DOCUMENTED IN MEDICAL RECORD: ICD-10-PCS | Mod: CPTII,S$GLB,, | Performed by: PODIATRIST

## 2021-08-23 PROCEDURE — 1159F MED LIST DOCD IN RCRD: CPT | Mod: CPTII,S$GLB,, | Performed by: PODIATRIST

## 2021-08-23 PROCEDURE — 1160F PR REVIEW ALL MEDS BY PRESCRIBER/CLIN PHARMACIST DOCUMENTED: ICD-10-PCS | Mod: CPTII,S$GLB,, | Performed by: PODIATRIST

## 2021-08-23 PROCEDURE — 1126F AMNT PAIN NOTED NONE PRSNT: CPT | Mod: CPTII,S$GLB,, | Performed by: PODIATRIST

## 2021-08-23 PROCEDURE — 99213 OFFICE O/P EST LOW 20 MIN: CPT | Mod: 25,S$GLB,, | Performed by: PODIATRIST

## 2021-08-23 PROCEDURE — 3008F PR BODY MASS INDEX (BMI) DOCUMENTED: ICD-10-PCS | Mod: CPTII,S$GLB,, | Performed by: PODIATRIST

## 2021-08-23 PROCEDURE — 1101F PR PT FALLS ASSESS DOC 0-1 FALLS W/OUT INJ PAST YR: ICD-10-PCS | Mod: CPTII,S$GLB,, | Performed by: PODIATRIST

## 2021-08-23 PROCEDURE — 99999 PR PBB SHADOW E&M-EST. PATIENT-LVL III: ICD-10-PCS | Mod: PBBFAC,,, | Performed by: PODIATRIST

## 2021-08-23 PROCEDURE — 11730 AVULSION NAIL PLATE SIMPLE 1: CPT | Mod: T6,S$GLB,, | Performed by: PODIATRIST

## 2021-08-23 PROCEDURE — 1160F RVW MEDS BY RX/DR IN RCRD: CPT | Mod: CPTII,S$GLB,, | Performed by: PODIATRIST

## 2021-08-23 RX ORDER — TERBINAFINE HYDROCHLORIDE 250 MG/1
250 TABLET ORAL DAILY
Qty: 90 TABLET | Refills: 0 | Status: SHIPPED | OUTPATIENT
Start: 2021-08-23 | End: 2022-05-02

## 2021-08-23 RX ORDER — KETOCONAZOLE 20 MG/G
CREAM TOPICAL DAILY
Qty: 1 TUBE | Refills: 3 | Status: SHIPPED | OUTPATIENT
Start: 2021-08-23 | End: 2022-04-06 | Stop reason: CLARIF

## 2021-10-04 ENCOUNTER — IMMUNIZATION (OUTPATIENT)
Dept: PHARMACY | Facility: CLINIC | Age: 66
End: 2021-10-04
Payer: COMMERCIAL

## 2021-10-04 DIAGNOSIS — Z23 NEED FOR VACCINATION: Primary | ICD-10-CM

## 2021-11-07 ENCOUNTER — OFFICE VISIT (OUTPATIENT)
Dept: URGENT CARE | Facility: CLINIC | Age: 66
End: 2021-11-07
Payer: COMMERCIAL

## 2021-11-07 VITALS
OXYGEN SATURATION: 97 % | DIASTOLIC BLOOD PRESSURE: 77 MMHG | HEART RATE: 80 BPM | TEMPERATURE: 99 F | SYSTOLIC BLOOD PRESSURE: 138 MMHG | RESPIRATION RATE: 18 BRPM | BODY MASS INDEX: 31.89 KG/M2 | HEIGHT: 63 IN | WEIGHT: 180 LBS

## 2021-11-07 DIAGNOSIS — R05.9 COUGH: ICD-10-CM

## 2021-11-07 DIAGNOSIS — R09.89 SYMPTOMS OF UPPER RESPIRATORY INFECTION (URI): Primary | ICD-10-CM

## 2021-11-07 LAB
CTP QC/QA: YES
SARS-COV-2 RDRP RESP QL NAA+PROBE: NEGATIVE

## 2021-11-07 PROCEDURE — 3075F PR MOST RECENT SYSTOLIC BLOOD PRESS GE 130-139MM HG: ICD-10-PCS | Mod: CPTII,S$GLB,, | Performed by: PHYSICIAN ASSISTANT

## 2021-11-07 PROCEDURE — 3075F SYST BP GE 130 - 139MM HG: CPT | Mod: CPTII,S$GLB,, | Performed by: PHYSICIAN ASSISTANT

## 2021-11-07 PROCEDURE — 1160F RVW MEDS BY RX/DR IN RCRD: CPT | Mod: CPTII,S$GLB,, | Performed by: PHYSICIAN ASSISTANT

## 2021-11-07 PROCEDURE — 1159F MED LIST DOCD IN RCRD: CPT | Mod: CPTII,S$GLB,, | Performed by: PHYSICIAN ASSISTANT

## 2021-11-07 PROCEDURE — 3078F PR MOST RECENT DIASTOLIC BLOOD PRESSURE < 80 MM HG: ICD-10-PCS | Mod: CPTII,S$GLB,, | Performed by: PHYSICIAN ASSISTANT

## 2021-11-07 PROCEDURE — 99213 OFFICE O/P EST LOW 20 MIN: CPT | Mod: S$GLB,,, | Performed by: PHYSICIAN ASSISTANT

## 2021-11-07 PROCEDURE — 3008F PR BODY MASS INDEX (BMI) DOCUMENTED: ICD-10-PCS | Mod: CPTII,S$GLB,, | Performed by: PHYSICIAN ASSISTANT

## 2021-11-07 PROCEDURE — 3008F BODY MASS INDEX DOCD: CPT | Mod: CPTII,S$GLB,, | Performed by: PHYSICIAN ASSISTANT

## 2021-11-07 PROCEDURE — U0002 COVID-19 LAB TEST NON-CDC: HCPCS | Mod: QW,S$GLB,, | Performed by: PHYSICIAN ASSISTANT

## 2021-11-07 PROCEDURE — 1159F PR MEDICATION LIST DOCUMENTED IN MEDICAL RECORD: ICD-10-PCS | Mod: CPTII,S$GLB,, | Performed by: PHYSICIAN ASSISTANT

## 2021-11-07 PROCEDURE — 3078F DIAST BP <80 MM HG: CPT | Mod: CPTII,S$GLB,, | Performed by: PHYSICIAN ASSISTANT

## 2021-11-07 PROCEDURE — 99213 PR OFFICE/OUTPT VISIT, EST, LEVL III, 20-29 MIN: ICD-10-PCS | Mod: S$GLB,,, | Performed by: PHYSICIAN ASSISTANT

## 2021-11-07 PROCEDURE — U0002: ICD-10-PCS | Mod: QW,S$GLB,, | Performed by: PHYSICIAN ASSISTANT

## 2021-11-07 PROCEDURE — 1160F PR REVIEW ALL MEDS BY PRESCRIBER/CLIN PHARMACIST DOCUMENTED: ICD-10-PCS | Mod: CPTII,S$GLB,, | Performed by: PHYSICIAN ASSISTANT

## 2021-11-07 RX ORDER — ALBUTEROL SULFATE 90 UG/1
AEROSOL, METERED RESPIRATORY (INHALATION)
COMMUNITY
Start: 2021-11-06 | End: 2022-04-06 | Stop reason: CLARIF

## 2021-11-07 RX ORDER — PREDNISONE 20 MG/1
1 TABLET ORAL
COMMUNITY
Start: 2021-11-06 | End: 2022-04-06 | Stop reason: CLARIF

## 2021-11-07 RX ORDER — PROMETHAZINE HYDROCHLORIDE AND DEXTROMETHORPHAN HYDROBROMIDE 6.25; 15 MG/5ML; MG/5ML
5 SYRUP ORAL
Qty: 180 ML | Refills: 0 | Status: SHIPPED | OUTPATIENT
Start: 2021-11-07 | End: 2021-11-14

## 2021-11-07 RX ORDER — BENZONATATE 100 MG/1
100 CAPSULE ORAL EVERY 6 HOURS PRN
Qty: 28 CAPSULE | Refills: 0 | Status: SHIPPED | OUTPATIENT
Start: 2021-11-07 | End: 2021-11-14

## 2021-12-08 ENCOUNTER — TELEPHONE (OUTPATIENT)
Dept: FAMILY MEDICINE | Facility: CLINIC | Age: 66
End: 2021-12-08
Payer: COMMERCIAL

## 2021-12-08 DIAGNOSIS — F41.9 ANXIETY: Primary | ICD-10-CM

## 2021-12-08 RX ORDER — BUPROPION HYDROCHLORIDE 150 MG/1
150 TABLET ORAL DAILY
Qty: 30 TABLET | Refills: 0 | Status: SHIPPED | OUTPATIENT
Start: 2021-12-08 | End: 2022-01-03

## 2021-12-09 RX ORDER — BUSPIRONE HYDROCHLORIDE 10 MG/1
10 TABLET ORAL 2 TIMES DAILY PRN
Qty: 60 TABLET | Refills: 2 | Status: SHIPPED | OUTPATIENT
Start: 2021-12-09 | End: 2022-03-03

## 2021-12-29 ENCOUNTER — TELEPHONE (OUTPATIENT)
Dept: FAMILY MEDICINE | Facility: CLINIC | Age: 66
End: 2021-12-29
Payer: COMMERCIAL

## 2022-02-02 DIAGNOSIS — E03.9 HYPOTHYROIDISM, UNSPECIFIED TYPE: ICD-10-CM

## 2022-02-02 RX ORDER — THYROID, PORCINE 120 MG/1
TABLET ORAL
Qty: 90 TABLET | Refills: 3 | Status: SHIPPED | OUTPATIENT
Start: 2022-02-02

## 2022-02-02 NOTE — TELEPHONE ENCOUNTER
Care Due:                  Date            Visit Type   Department     Provider  --------------------------------------------------------------------------------                                EP -                              PRIMARY      [facility]  Last Visit: 08-      CARE ([facility])   MEDICINE       Jakub Esquivel  Next Visit: None Scheduled  None         None Found                                                            Last  Test          Frequency    Reason                     Performed    Due Date  --------------------------------------------------------------------------------    TSH.........  12 months..  thyroid,.................  Not Found    Overdue    Powered by [employer]. Reference number: [id number].   2/02/2022 10:32:11 AM CST

## 2022-04-06 ENCOUNTER — PATIENT MESSAGE (OUTPATIENT)
Dept: FAMILY MEDICINE | Facility: CLINIC | Age: 67
End: 2022-04-06
Payer: COMMERCIAL

## 2022-04-08 PROBLEM — M18.0 OSTEOARTHRITIS OF CARPOMETACARPAL (CMC) JOINT OF BOTH THUMBS: Status: ACTIVE | Noted: 2022-04-08

## 2022-04-13 DIAGNOSIS — Z12.31 OTHER SCREENING MAMMOGRAM: ICD-10-CM

## 2022-04-18 ENCOUNTER — PATIENT MESSAGE (OUTPATIENT)
Dept: ADMINISTRATIVE | Facility: HOSPITAL | Age: 67
End: 2022-04-18
Payer: COMMERCIAL

## 2022-04-22 NOTE — TELEPHONE ENCOUNTER
Care Due:                  Date            Visit Type   Department     Provider  --------------------------------------------------------------------------------                                EP -                              PRIMARY      [facility] FAM[facility]  Last Visit: 08-      CARE ([facility])   MEDICINE       Jakbu Esquivel  Next Visit: None Scheduled  None         None Found                                                            Last  Test          Frequency    Reason                     Performed    Due Date  --------------------------------------------------------------------------------    TSH.........  12 months..  ANGI...................  Not Found    Overdue    Powered by [employer]. Reference number: [id number].   4/22/2022 3:31:51 PM CDT

## 2022-04-22 NOTE — TELEPHONE ENCOUNTER
Refill Routing Note   Medication(s) are not appropriate for processing by [facility] for the following reason(s):      - Patient has been seen in the ED/Hospital since the last PCP visit    [facility] action(s):  Defer Medication-related problems identified: Requires labs     Medication Therapy Plan: Labs (TSH) overdue; Last PCP Visit: 8/17/2021 Last Admission: 4/8/2022  Medication reconciliation completed: No     Appointments  past 12m or future 3m with PCP    Date Provider   Last Visit   8/17/2021 Jakub Esquivel MD   Next Visit   Visit date not found Jakub Esquivel MD   ED visits in past 90 days: 0        Note composed:4:27 PM 04/22/2022

## 2022-04-25 RX ORDER — RIZATRIPTAN BENZOATE 10 MG/1
TABLET, ORALLY DISINTEGRATING ORAL
Qty: 9 TABLET | Refills: 1 | Status: SHIPPED | OUTPATIENT
Start: 2022-04-25 | End: 2022-07-01 | Stop reason: SDUPTHER

## 2022-04-28 ENCOUNTER — PATIENT OUTREACH (OUTPATIENT)
Dept: ADMINISTRATIVE | Facility: OTHER | Age: 67
End: 2022-04-28
Payer: COMMERCIAL

## 2022-04-28 NOTE — PROGRESS NOTES
Health Maintenance Due   Topic Date Due    Hepatitis C Screening  Never done    TETANUS VACCINE  Never done    Shingles Vaccine (1 of 2) Never done    Pneumococcal Vaccines (Age 65+) (1 - PCV) Never done    DEXA Scan  07/26/2021    Influenza Vaccine (1) Never done    COVID-19 Vaccine (3 - Booster for Moderna series) 03/04/2022     Updates were requested from care everywhere.  Chart was reviewed for overdue Proactive [facility] ([facility]) topics (CRS, Breast Cancer Screening, Eye exam)  Health Maintenance has been updated.  LINKS immunization registry triggered.  Immunizations were reconciled.

## 2022-05-24 ENCOUNTER — HOSPITAL ENCOUNTER (OUTPATIENT)
Dept: RADIOLOGY | Facility: HOSPITAL | Age: 67
Discharge: HOME | End: 2022-05-24
Attending: FAMILY MEDICINE
Payer: COMMERCIAL

## 2022-05-24 DIAGNOSIS — Z12.31 OTHER SCREENING MAMMOGRAM: ICD-10-CM

## 2022-05-24 PROCEDURE — 77067 SCR MAMMO BI INCL CAD: CPT | Mod: 26,,, | Performed by: RADIOLOGY

## 2022-05-24 PROCEDURE — 77063 MAMMO DIGITAL SCREENING BILAT WITH TOMO: ICD-10-PCS | Mod: 26,,, | Performed by: RADIOLOGY

## 2022-05-24 PROCEDURE — 77063 BREAST TOMOSYNTHESIS BI: CPT | Mod: 26,,, | Performed by: RADIOLOGY

## 2022-05-24 PROCEDURE — 77063 BREAST TOMOSYNTHESIS BI: CPT | Mod: TC,PO

## 2022-05-24 PROCEDURE — 77067 MAMMO DIGITAL SCREENING BILAT WITH TOMO: ICD-10-PCS | Mod: 26,,, | Performed by: RADIOLOGY

## 2022-05-24 PROCEDURE — 77067 SCR MAMMO BI INCL CAD: CPT | Mod: TC,PO

## 2022-07-01 ENCOUNTER — PATIENT MESSAGE (OUTPATIENT)
Dept: FAMILY MEDICINE | Facility: CLINIC | Age: 67
End: 2022-07-01
Payer: COMMERCIAL

## 2022-07-02 NOTE — TELEPHONE ENCOUNTER
Refill Routing Note   Medication(s) are not appropriate for processing by [facility] for the following reason(s):      - Required vitals are abnormal    [facility] action(s):  Defer Medication-related problems identified: Requires appointment        Medication reconciliation completed: No     Appointments  past 12m or future 3m with PCP    Date Provider   Last Visit   8/17/2021 Jakub Esquivel MD   Next Visit   Visit date not found Jakub Esquivel MD   ED visits in past 90 days: 0        Note composed:1:32 PM 07/02/2022

## 2022-07-02 NOTE — TELEPHONE ENCOUNTER
Care Due:                  Date            Visit Type   Department     Provider  --------------------------------------------------------------------------------                                EP -                              PRIMARY      [facility] FAMILY  Last Visit: 08-      CARE ([facility])   MEDICINE       Jakub Esquivel  Next Visit: None Scheduled  None         None Found                                                            Last  Test          Frequency    Reason                     Performed    Due Date  --------------------------------------------------------------------------------    Office Visit  12 months..  ARMOUR, buPROPion,         08- 08-                             rizatriptan..............    [facility] Embedded Care Gaps. Reference number: [id number]. 7/02/2022   12:18:19 AM CDT

## 2022-07-05 RX ORDER — BUPROPION HYDROCHLORIDE 150 MG/1
TABLET ORAL
Qty: 90 TABLET | Refills: 0 | Status: SHIPPED | OUTPATIENT
Start: 2022-07-05 | End: 2022-09-26

## 2022-10-10 ENCOUNTER — OFFICE VISIT (OUTPATIENT)
Dept: PODIATRY | Facility: CLINIC | Age: 67
End: 2022-10-10
Payer: COMMERCIAL

## 2022-10-10 DIAGNOSIS — S90.32XA CONTUSION OF LEFT FOOT, INITIAL ENCOUNTER: Primary | ICD-10-CM

## 2022-10-10 PROCEDURE — 1101F PT FALLS ASSESS-DOCD LE1/YR: CPT | Mod: CPTII,S$GLB,, | Performed by: PODIATRIST

## 2022-10-10 PROCEDURE — 1160F PR REVIEW ALL MEDS BY PRESCRIBER/CLIN PHARMACIST DOCUMENTED: ICD-10-PCS | Mod: CPTII,S$GLB,, | Performed by: PODIATRIST

## 2022-10-10 PROCEDURE — 99999 PR PBB SHADOW E&M-EST. PATIENT-LVL III: ICD-10-PCS | Mod: PBBFAC,,, | Performed by: PODIATRIST

## 2022-10-10 PROCEDURE — 1126F AMNT PAIN NOTED NONE PRSNT: CPT | Mod: CPTII,S$GLB,, | Performed by: PODIATRIST

## 2022-10-10 PROCEDURE — 99214 PR OFFICE/OUTPT VISIT, EST, LEVL IV, 30-39 MIN: ICD-10-PCS | Mod: S$GLB,,, | Performed by: PODIATRIST

## 2022-10-10 PROCEDURE — 3288F PR FALLS RISK ASSESSMENT DOCUMENTED: ICD-10-PCS | Mod: CPTII,S$GLB,, | Performed by: PODIATRIST

## 2022-10-10 PROCEDURE — 1101F PR PT FALLS ASSESS DOC 0-1 FALLS W/OUT INJ PAST YR: ICD-10-PCS | Mod: CPTII,S$GLB,, | Performed by: PODIATRIST

## 2022-10-10 PROCEDURE — 1159F MED LIST DOCD IN RCRD: CPT | Mod: CPTII,S$GLB,, | Performed by: PODIATRIST

## 2022-10-10 PROCEDURE — 1126F PR PAIN SEVERITY QUANTIFIED, NO PAIN PRESENT: ICD-10-PCS | Mod: CPTII,S$GLB,, | Performed by: PODIATRIST

## 2022-10-10 PROCEDURE — 1159F PR MEDICATION LIST DOCUMENTED IN MEDICAL RECORD: ICD-10-PCS | Mod: CPTII,S$GLB,, | Performed by: PODIATRIST

## 2022-10-10 PROCEDURE — 99214 OFFICE O/P EST MOD 30 MIN: CPT | Mod: S$GLB,,, | Performed by: PODIATRIST

## 2022-10-10 PROCEDURE — 1160F RVW MEDS BY RX/DR IN RCRD: CPT | Mod: CPTII,S$GLB,, | Performed by: PODIATRIST

## 2022-10-10 PROCEDURE — 3288F FALL RISK ASSESSMENT DOCD: CPT | Mod: CPTII,S$GLB,, | Performed by: PODIATRIST

## 2022-10-10 PROCEDURE — 99999 PR PBB SHADOW E&M-EST. PATIENT-LVL III: CPT | Mod: PBBFAC,,, | Performed by: PODIATRIST

## 2022-10-11 ENCOUNTER — HOSPITAL ENCOUNTER (OUTPATIENT)
Dept: RADIOLOGY | Facility: HOSPITAL | Age: 67
Discharge: HOME | End: 2022-10-11
Attending: PODIATRIST
Payer: COMMERCIAL

## 2022-10-11 ENCOUNTER — TELEPHONE (OUTPATIENT)
Dept: PODIATRY | Facility: CLINIC | Age: 67
End: 2022-10-11
Payer: COMMERCIAL

## 2022-10-11 DIAGNOSIS — S90.32XA CONTUSION OF LEFT FOOT, INITIAL ENCOUNTER: ICD-10-CM

## 2022-10-11 PROCEDURE — 73630 X-RAY EXAM OF FOOT: CPT | Mod: 26,LT,, | Performed by: RADIOLOGY

## 2022-10-11 PROCEDURE — 73630 XR FOOT COMPLETE 3 VIEW LEFT: ICD-10-PCS | Mod: 26,LT,, | Performed by: RADIOLOGY

## 2022-10-11 PROCEDURE — 73630 X-RAY EXAM OF FOOT: CPT | Mod: TC,PN,LT

## 2022-10-11 NOTE — TELEPHONE ENCOUNTER
----- Message from Mattie Brewer sent at 10/11/2022  1:49 PM CDT -----  Contact: patient  Type: Needs Medical Advice  Who Called:  patient  Best Call Back Number: [phone] (home)   Additional Information: patient received her results on mychart and saw that her foot is broken, wants to know if she can be seen today to f/u

## 2022-10-14 ENCOUNTER — OFFICE VISIT (OUTPATIENT)
Dept: PODIATRY | Facility: CLINIC | Age: 67
End: 2022-10-14
Payer: COMMERCIAL

## 2022-10-14 ENCOUNTER — HOSPITAL ENCOUNTER (OUTPATIENT)
Dept: RADIOLOGY | Facility: HOSPITAL | Age: 67
Discharge: HOME | End: 2022-10-14
Attending: PODIATRIST
Payer: COMMERCIAL

## 2022-10-14 DIAGNOSIS — S92.425A NONDISPLACED FRACTURE OF DISTAL PHALANX OF LEFT GREAT TOE, INITIAL ENCOUNTER FOR CLOSED FRACTURE: ICD-10-CM

## 2022-10-14 DIAGNOSIS — S92.425A NONDISPLACED FRACTURE OF DISTAL PHALANX OF LEFT GREAT TOE, INITIAL ENCOUNTER FOR CLOSED FRACTURE: Primary | ICD-10-CM

## 2022-10-14 PROCEDURE — 1160F RVW MEDS BY RX/DR IN RCRD: CPT | Mod: CPTII,S$GLB,, | Performed by: PODIATRIST

## 2022-10-14 PROCEDURE — 3288F FALL RISK ASSESSMENT DOCD: CPT | Mod: CPTII,S$GLB,, | Performed by: PODIATRIST

## 2022-10-14 PROCEDURE — 99213 PR OFFICE/OUTPT VISIT, EST, LEVL III, 20-29 MIN: ICD-10-PCS | Mod: S$GLB,,, | Performed by: PODIATRIST

## 2022-10-14 PROCEDURE — 73630 X-RAY EXAM OF FOOT: CPT | Mod: 26,LT,, | Performed by: RADIOLOGY

## 2022-10-14 PROCEDURE — 73630 XR FOOT COMPLETE 3 VIEW LEFT: ICD-10-PCS | Mod: 26,LT,, | Performed by: RADIOLOGY

## 2022-10-14 PROCEDURE — 3288F PR FALLS RISK ASSESSMENT DOCUMENTED: ICD-10-PCS | Mod: CPTII,S$GLB,, | Performed by: PODIATRIST

## 2022-10-14 PROCEDURE — 1126F PR PAIN SEVERITY QUANTIFIED, NO PAIN PRESENT: ICD-10-PCS | Mod: CPTII,S$GLB,, | Performed by: PODIATRIST

## 2022-10-14 PROCEDURE — 99999 PR PBB SHADOW E&M-EST. PATIENT-LVL III: CPT | Mod: PBBFAC,,, | Performed by: PODIATRIST

## 2022-10-14 PROCEDURE — 1159F PR MEDICATION LIST DOCUMENTED IN MEDICAL RECORD: ICD-10-PCS | Mod: CPTII,S$GLB,, | Performed by: PODIATRIST

## 2022-10-14 PROCEDURE — 1101F PR PT FALLS ASSESS DOC 0-1 FALLS W/OUT INJ PAST YR: ICD-10-PCS | Mod: CPTII,S$GLB,, | Performed by: PODIATRIST

## 2022-10-14 PROCEDURE — 99213 OFFICE O/P EST LOW 20 MIN: CPT | Mod: S$GLB,,, | Performed by: PODIATRIST

## 2022-10-14 PROCEDURE — 1160F PR REVIEW ALL MEDS BY PRESCRIBER/CLIN PHARMACIST DOCUMENTED: ICD-10-PCS | Mod: CPTII,S$GLB,, | Performed by: PODIATRIST

## 2022-10-14 PROCEDURE — 1159F MED LIST DOCD IN RCRD: CPT | Mod: CPTII,S$GLB,, | Performed by: PODIATRIST

## 2022-10-14 PROCEDURE — 99999 PR PBB SHADOW E&M-EST. PATIENT-LVL III: ICD-10-PCS | Mod: PBBFAC,,, | Performed by: PODIATRIST

## 2022-10-14 PROCEDURE — 1101F PT FALLS ASSESS-DOCD LE1/YR: CPT | Mod: CPTII,S$GLB,, | Performed by: PODIATRIST

## 2022-10-14 PROCEDURE — 1126F AMNT PAIN NOTED NONE PRSNT: CPT | Mod: CPTII,S$GLB,, | Performed by: PODIATRIST

## 2022-10-14 PROCEDURE — 73630 X-RAY EXAM OF FOOT: CPT | Mod: TC,PO,LT

## 2022-10-14 RX ORDER — SULFAMETHOXAZOLE AND TRIMETHOPRIM 800; 160 MG/1; MG/1
1 TABLET ORAL 2 TIMES DAILY
Qty: 14 TABLET | Refills: 0 | Status: SHIPPED | OUTPATIENT
Start: 2022-10-14 | End: 2022-10-21

## 2022-10-14 NOTE — PROGRESS NOTES
Subjective:      Patient ID: Fabricio Sawyer is a 67 y.o. female.    Chief Complaint: Follow-up (Broke lg. toe)    Fabricio is a 67 y.o. female who presents to the clinic complaining of thick and discolored toenails on both feet. Fabricio is inquiring about treatment options. She tried PO lamisil in the past and it helped but it has come back. No pain noted but she is motivated to treat the nail fungus again.    8/23/21: Patient presents for right second nail avulsion as discussed at previous visit, non permanent. Her hepatic function labs came back normal and is requesting the lamisil be sent in as well.      Review of Systems   Constitutional: Negative for chills and fever.   Cardiovascular: Negative for claudication and leg swelling.   Respiratory: Negative for shortness of breath.    Skin: Positive for nail changes. Negative for itching and rash.   Musculoskeletal: Negative for muscle cramps, muscle weakness and myalgias.   Gastrointestinal: Negative for nausea and vomiting.   Neurological: Negative for focal weakness, loss of balance, numbness and paresthesias.           Objective:      Physical Exam  Constitutional:       General: She is not in acute distress.     Appearance: She is well-developed. She is not diaphoretic.   Cardiovascular:      Pulses:           Dorsalis pedis pulses are 2+ on the right side and 2+ on the left side.        Posterior tibial pulses are 2+ on the right side and 2+ on the left side.      Comments: < 3 sec capillary refill time to toes 1-5 bilateral. Toes and feet are warm to touch proximally with normal distal cooling b/l. There is some hair growth on the feet and toes b/l. There is no edema b/l. No spider veins or varicosities present b/l.     Musculoskeletal:      Comments: Equinus noted b/l ankles with < 10 deg DF noted. MMT 5/5 in DF/PF/Inv/Ev resistance with no reproduction of pain in any direction. Passive range of motion of ankle and pedal joints is painless b/l.     Skin:      General: Skin is warm and dry.      Coloration: Skin is not pale.      Findings: No abrasion, bruising, burn, ecchymosis, erythema, laceration, lesion, petechiae or rash.      Nails: There is no clubbing.      Comments: Skin temperature, texture and turgor within normal limits.    Nails 1-5 bilateral are discolored without subungual debris    Right second nail is thick with subungual debris     Neurological:      Mental Status: She is alert and oriented to person, place, and time.      Sensory: No sensory deficit.      Motor: No tremor, atrophy or abnormal muscle tone.      Comments: Negative tinel sign bilateral.   Psychiatric:         Behavior: Behavior normal.               Assessment:       Encounter Diagnosis   Name Primary?    Nondisplaced fracture of distal phalanx of left great toe, initial encounter for closed fracture Yes         Plan:       Fabricio was seen today for follow-up.    Diagnoses and all orders for this visit:    Nondisplaced fracture of distal phalanx of left great toe, initial encounter for closed fracture  -     X-Ray Foot Complete Left; Future    Other orders  -     sulfamethoxazole-trimethoprim 800-160mg (BACTRIM DS) 800-160 mg Tab; Take 1 tablet by mouth 2 (two) times daily. for 7 days    I counseled the patient on her conditions, their implications and medical management.    Discussed treatment options for fungal toenails to include topical vs oral vs laser vs combined therapy in detail.    We discussed using Lamisil for onychomycosis. This drug offers a fairly high but not universal cure rate. A 12 week treatment course is recommended. The patient is aware that rare cases of liver injury have been reported; and agrees to come in for liver function tests at 6 weeks of treatment. The symptoms of liver disease have been discussed; call if such occurs.    Upon reviewing the risks/benefits,  the planned procedure, the surgical goal, and the postoperative course for the right second toenail non  permanent removal, the written consent was signed, timed, and dated by the patient with a witness present.      Hepatic function labs to be again in 6 weeks to follow liver function    Return PRN    Femi Sprague DPM

## 2022-10-25 NOTE — PROGRESS NOTES
Subjective:      Patient ID: Fabricio Sawyer is a 67 y.o. female.    Chief Complaint: Foot Pain    Fabricio is a 67 y.o. female who presents to the clinic with  left great toe pain after dropping a cement stone on the toe. The toe is painful with touch or pressure, pain with ambulation No noted drainage or bleeding, toenail is intact.      Review of Systems   Constitutional: Negative for chills and fever.   Cardiovascular:  Negative for claudication and leg swelling.   Respiratory:  Negative for shortness of breath.    Skin:  Positive for nail changes. Negative for itching and rash.   Musculoskeletal:  Negative for muscle cramps, muscle weakness and myalgias.   Gastrointestinal:  Negative for nausea and vomiting.   Neurological:  Negative for focal weakness, loss of balance, numbness and paresthesias.         Objective:      Physical Exam  Constitutional:       General: She is not in acute distress.     Appearance: She is well-developed. She is not diaphoretic.   Cardiovascular:      Pulses:           Dorsalis pedis pulses are 2+ on the right side and 2+ on the left side.        Posterior tibial pulses are 2+ on the right side and 2+ on the left side.      Comments: < 3 sec capillary refill time to toes 1-5 bilateral. Toes and feet are warm to touch proximally with normal distal cooling b/l. There is some hair growth on the feet and toes b/l. There is no edema b/l. No spider veins or varicosities present b/l.     Musculoskeletal:      Comments: Equinus noted b/l ankles with < 10 deg DF noted. MMT 5/5 in DF/PF/Inv/Ev resistance with no reproduction of pain in any direction. Passive range of motion of ankle and pedal joints is painless b/l.    Left great toe edema, ecchymosis and pain with palpation and with ROM at the IPJ     Skin:     General: Skin is warm and dry.      Coloration: Skin is not pale.      Findings: No abrasion, bruising, burn, ecchymosis, erythema, laceration, lesion, petechiae or rash.      Nails:  There is no clubbing.      Comments: Skin temperature, texture and turgor within normal limits.    Nails 1-5 bilateral are discolored without subungual debris    Right second nail is thick with subungual debris     Neurological:      Mental Status: She is alert and oriented to person, place, and time.      Sensory: No sensory deficit.      Motor: No tremor, atrophy or abnormal muscle tone.      Comments: Negative tinel sign bilateral.   Psychiatric:         Behavior: Behavior normal.             Assessment:       Encounter Diagnosis   Name Primary?    Contusion of left foot, initial encounter Yes         Plan:       Fabricio was seen today for foot pain.    Diagnoses and all orders for this visit:    Contusion of left foot, initial encounter  -     X-Ray Foot Complete Left; Future    I counseled the patient on her conditions, their implications and medical management.    X-rays today shoe a left great toe distal phalanx comminuted fracture that does not involve the IPJ    The nail is intact and there does not appear to be any open wounds     Dispensed darco shoe, ambulate in the shoe only for 6 weeks    Return 6 weeks with x-rays    Femi Sprague DPM

## 2022-10-26 NOTE — PROGRESS NOTES
Subjective:      Patient ID: Fabricio Sawyer is a 67 y.o. female.    Chief Complaint: Follow-up (Broke lg. toe)    Fabricio is a 67 y.o. female who presents to the clinic with  left great toe pain after dropping a cement stone on the toe. The toe is painful with touch or pressure, pain with ambulation No noted drainage or bleeding, toenail is intact.    10/14/22 Patient returns for follow up left great toe pain to go over the x-ray and treatment going forward    Review of Systems   Constitutional: Negative for chills and fever.   Cardiovascular:  Negative for claudication and leg swelling.   Respiratory:  Negative for shortness of breath.    Skin:  Positive for nail changes. Negative for itching and rash.   Musculoskeletal:  Negative for muscle cramps, muscle weakness and myalgias.   Gastrointestinal:  Negative for nausea and vomiting.   Neurological:  Negative for focal weakness, loss of balance, numbness and paresthesias.         Objective:      Physical Exam  Constitutional:       General: She is not in acute distress.     Appearance: She is well-developed. She is not diaphoretic.   Cardiovascular:      Pulses:           Dorsalis pedis pulses are 2+ on the right side and 2+ on the left side.        Posterior tibial pulses are 2+ on the right side and 2+ on the left side.      Comments: < 3 sec capillary refill time to toes 1-5 bilateral. Toes and feet are warm to touch proximally with normal distal cooling b/l. There is some hair growth on the feet and toes b/l. There is no edema b/l. No spider veins or varicosities present b/l.     Musculoskeletal:      Comments: Equinus noted b/l ankles with < 10 deg DF noted. MMT 5/5 in DF/PF/Inv/Ev resistance with no reproduction of pain in any direction. Passive range of motion of ankle and pedal joints is painless b/l.    Left great toe edema, ecchymosis and pain with palpation and with ROM at the IPJ     Skin:     General: Skin is warm and dry.      Coloration: Skin is not  pale.      Findings: No abrasion, bruising, burn, ecchymosis, erythema, laceration, lesion, petechiae or rash.      Nails: There is no clubbing.      Comments: Skin temperature, texture and turgor within normal limits.    Nails 1-5 bilateral are discolored without subungual debris    Right second nail is thick with subungual debris     Neurological:      Mental Status: She is alert and oriented to person, place, and time.      Sensory: No sensory deficit.      Motor: No tremor, atrophy or abnormal muscle tone.      Comments: Negative tinel sign bilateral.   Psychiatric:         Behavior: Behavior normal.             Assessment:       Encounter Diagnosis   Name Primary?    Nondisplaced fracture of distal phalanx of left great toe, initial encounter for closed fracture Yes         Plan:       Fabricio was seen today for follow-up.    Diagnoses and all orders for this visit:    Nondisplaced fracture of distal phalanx of left great toe, initial encounter for closed fracture  -     X-Ray Foot Complete Left; Future    Other orders  -     sulfamethoxazole-trimethoprim 800-160mg (BACTRIM DS) 800-160 mg Tab; Take 1 tablet by mouth 2 (two) times daily. for 7 days    I counseled the patient on her conditions, their implications and medical management.    X-rays with a left great toe distal phalanx comminuted fracture that does not involve the IPJ    The nail is intact and there does not appear to be any open wounds but there is some underlying discoloration today, start her on bactrim to avoid infection    Reviewed x-ray with patient showing her the fracture    Dispensed darco shoe, ambulate in the shoe only for 6 weeks    Return 6 weeks with x-rays    Femi Sprague DPM

## 2022-11-21 ENCOUNTER — OFFICE VISIT (OUTPATIENT)
Dept: PODIATRY | Facility: CLINIC | Age: 67
End: 2022-11-21
Payer: COMMERCIAL

## 2022-11-21 DIAGNOSIS — S92.425D NONDISPLACED FRACTURE OF DISTAL PHALANX OF LEFT GREAT TOE, SUBSEQUENT ENCOUNTER FOR FRACTURE WITH ROUTINE HEALING: Primary | ICD-10-CM

## 2022-11-21 PROCEDURE — 1160F PR REVIEW ALL MEDS BY PRESCRIBER/CLIN PHARMACIST DOCUMENTED: ICD-10-PCS | Mod: CPTII,S$GLB,, | Performed by: PODIATRIST

## 2022-11-21 PROCEDURE — 3288F PR FALLS RISK ASSESSMENT DOCUMENTED: ICD-10-PCS | Mod: CPTII,S$GLB,, | Performed by: PODIATRIST

## 2022-11-21 PROCEDURE — 99213 PR OFFICE/OUTPT VISIT, EST, LEVL III, 20-29 MIN: ICD-10-PCS | Mod: S$GLB,,, | Performed by: PODIATRIST

## 2022-11-21 PROCEDURE — 99213 OFFICE O/P EST LOW 20 MIN: CPT | Mod: S$GLB,,, | Performed by: PODIATRIST

## 2022-11-21 PROCEDURE — 1159F MED LIST DOCD IN RCRD: CPT | Mod: CPTII,S$GLB,, | Performed by: PODIATRIST

## 2022-11-21 PROCEDURE — 99999 PR PBB SHADOW E&M-EST. PATIENT-LVL III: CPT | Mod: PBBFAC,,, | Performed by: PODIATRIST

## 2022-11-21 PROCEDURE — 1126F PR PAIN SEVERITY QUANTIFIED, NO PAIN PRESENT: ICD-10-PCS | Mod: CPTII,S$GLB,, | Performed by: PODIATRIST

## 2022-11-21 PROCEDURE — 1160F RVW MEDS BY RX/DR IN RCRD: CPT | Mod: CPTII,S$GLB,, | Performed by: PODIATRIST

## 2022-11-21 PROCEDURE — 1126F AMNT PAIN NOTED NONE PRSNT: CPT | Mod: CPTII,S$GLB,, | Performed by: PODIATRIST

## 2022-11-21 PROCEDURE — 3288F FALL RISK ASSESSMENT DOCD: CPT | Mod: CPTII,S$GLB,, | Performed by: PODIATRIST

## 2022-11-21 PROCEDURE — 99999 PR PBB SHADOW E&M-EST. PATIENT-LVL III: ICD-10-PCS | Mod: PBBFAC,,, | Performed by: PODIATRIST

## 2022-11-21 PROCEDURE — 1101F PR PT FALLS ASSESS DOC 0-1 FALLS W/OUT INJ PAST YR: ICD-10-PCS | Mod: CPTII,S$GLB,, | Performed by: PODIATRIST

## 2022-11-21 PROCEDURE — 1101F PT FALLS ASSESS-DOCD LE1/YR: CPT | Mod: CPTII,S$GLB,, | Performed by: PODIATRIST

## 2022-11-21 PROCEDURE — 1159F PR MEDICATION LIST DOCUMENTED IN MEDICAL RECORD: ICD-10-PCS | Mod: CPTII,S$GLB,, | Performed by: PODIATRIST

## 2022-11-21 NOTE — PROGRESS NOTES
Subjective:      Patient ID: Fabricio Sawyer is a 67 y.o. female.    Chief Complaint: Follow-up    Fabricio is a 67 y.o. female who presents to the clinic with  left great toe pain after dropping a cement stone on the toe. The toe is painful with touch or pressure, pain with ambulation No noted drainage or bleeding, toenail is intact.    10/14/22 Patient returns for follow up left great toe pain to go over the x-ray and treatment going forward    11/21/22: Patient returns 6 weeks ambulating in darco shoe back in her normal shoes with minimal discomfort.     Review of Systems   Constitutional: Negative for chills and fever.   Cardiovascular:  Negative for claudication and leg swelling.   Respiratory:  Negative for shortness of breath.    Skin:  Positive for nail changes. Negative for itching and rash.   Musculoskeletal:  Negative for muscle cramps, muscle weakness and myalgias.   Gastrointestinal:  Negative for nausea and vomiting.   Neurological:  Negative for focal weakness, loss of balance, numbness and paresthesias.         Objective:      Physical Exam  Constitutional:       General: She is not in acute distress.     Appearance: She is well-developed. She is not diaphoretic.   Cardiovascular:      Pulses:           Dorsalis pedis pulses are 2+ on the right side and 2+ on the left side.        Posterior tibial pulses are 2+ on the right side and 2+ on the left side.      Comments: < 3 sec capillary refill time to toes 1-5 bilateral. Toes and feet are warm to touch proximally with normal distal cooling b/l. There is some hair growth on the feet and toes b/l. There is no edema b/l. No spider veins or varicosities present b/l.     Musculoskeletal:      Comments: Equinus noted b/l ankles with < 10 deg DF noted. MMT 5/5 in DF/PF/Inv/Ev resistance with no reproduction of pain in any direction. Passive range of motion of ankle and pedal joints is painless b/l.    Left great toe no longer has pain with palpation or ROM at  the IPJ     Skin:     General: Skin is warm and dry.      Coloration: Skin is not pale.      Findings: No abrasion, bruising, burn, ecchymosis, erythema, laceration, lesion, petechiae or rash.      Nails: There is no clubbing.      Comments: Skin temperature, texture and turgor within normal limits.    Nails 1-5 bilateral are discolored without subungual debris    Right second nail is thick with subungual debris     Neurological:      Mental Status: She is alert and oriented to person, place, and time.      Sensory: No sensory deficit.      Motor: No tremor, atrophy or abnormal muscle tone.      Comments: Negative tinel sign bilateral.   Psychiatric:         Behavior: Behavior normal.             Assessment:       Encounter Diagnosis   Name Primary?    Nondisplaced fracture of distal phalanx of left great toe, subsequent encounter for fracture with routine healing Yes           Plan:       Fabricio was seen today for follow-up.    Diagnoses and all orders for this visit:    Nondisplaced fracture of distal phalanx of left great toe, subsequent encounter for fracture with routine healing      I counseled the patient on her conditions, their implications and medical management.    Can continue in normal shoes and increase activity as tolerated    Return PRN    Femi Sprague DPM

## 2022-12-21 ENCOUNTER — OFFICE VISIT (OUTPATIENT)
Dept: FAMILY MEDICINE | Facility: CLINIC | Age: 67
End: 2022-12-21
Payer: COMMERCIAL

## 2022-12-21 DIAGNOSIS — G43.009 MIGRAINE WITHOUT AURA AND WITHOUT STATUS MIGRAINOSUS, NOT INTRACTABLE: Primary | ICD-10-CM

## 2022-12-21 PROCEDURE — 99213 OFFICE O/P EST LOW 20 MIN: CPT | Mod: 95,,, | Performed by: PHYSICIAN ASSISTANT

## 2022-12-21 PROCEDURE — 99213 PR OFFICE/OUTPT VISIT, EST, LEVL III, 20-29 MIN: ICD-10-PCS | Mod: 95,,, | Performed by: PHYSICIAN ASSISTANT

## 2022-12-21 RX ORDER — IBUPROFEN AND FAMOTIDINE 26.6; 8 MG/1; MG/1
1 TABLET ORAL 2 TIMES DAILY PRN
Qty: 30 TABLET | Refills: 0 | Status: SHIPPED | OUTPATIENT
Start: 2022-12-21

## 2022-12-21 NOTE — PROGRESS NOTES
Subjective:      Patient ID: Fabricio Sawyer is a 67 y.o. female.    Chief Complaint: Migraine    Patient is new to me.    HPI  Patient has PMH of migraines, anxiety, HLD, mitral valve prolapse, hypothyroid.    Patient reports having ongoing migraines.  She had a migraine last night that has not responded as well to the rizatriptan as well as another migraine two weeks ago with similar affect.  She reports nausea and photophobia with them.  No auras.  Taking rizatriptan immediately when having headache.    Has taken imitrex in the past with hallucinations.    Review of Systems   Constitutional:  Negative for activity change and unexpected weight change.   HENT:  Negative for hearing loss, rhinorrhea and trouble swallowing.    Eyes:  Negative for discharge and visual disturbance.   Respiratory:  Negative for chest tightness and wheezing.    Cardiovascular:  Negative for chest pain and palpitations.   Gastrointestinal:  Negative for blood in stool, constipation, diarrhea and vomiting.   Endocrine: Negative for polydipsia and polyuria.   Genitourinary:  Negative for difficulty urinating, dysuria, hematuria and menstrual problem.   Musculoskeletal:  Positive for neck pain. Negative for arthralgias and joint swelling.   Neurological:  Positive for headaches. Negative for weakness.   Psychiatric/Behavioral:  Negative for confusion and dysphoric mood.        Objective:   There were no vitals taken for this visit.    Physical Exam  Constitutional:       Appearance: Normal appearance.   HENT:      Head: Normocephalic and atraumatic.      Right Ear: External ear normal.      Left Ear: External ear normal.      Nose: Nose normal.   Eyes:      Conjunctiva/sclera: Conjunctivae normal.   Pulmonary:      Effort: No respiratory distress.   Skin:     Coloration: Skin is not jaundiced or pale.   Neurological:      General: No focal deficit present.      Mental Status: She is alert and oriented to person, place, and time.    Psychiatric:         Mood and Affect: Mood normal.         Behavior: Behavior normal.         Judgment: Judgment normal.     Assessment:      1. Migraine without aura and without status migrainosus, not intractable       Plan:   1. Migraine without aura and without status migrainosus, not intractable  Gave strict ER precautions for headaches not responding to oral medication.  - ibuprofen-famotidine (DUEXIS) 800-26.6 mg Tab; Take 1 tablet by mouth 2 (two) times daily as needed (migraine).  Dispense: 30 tablet; Refill: 0  - Ambulatory referral/consult to Neurology; Future    Follow up as needed.  Patient agreed with plan and expressed understanding.  The patient location is:  Patient Home   The chief complaint leading to consultation is: migraine  Visit type: Virtual visit with synchronous audio and video  Total time spent with patient: 24 minutes  Each patient to whom he or she provides medical services by telemedicine is:  (1) informed of the relationship between the physician and patient and the respective role of any other health care provider with respect to management of the patient; and (2) notified that he or she may decline to receive medical services by telemedicine and may withdraw from such care at any time.

## 2022-12-29 ENCOUNTER — OFFICE VISIT (OUTPATIENT)
Dept: FAMILY MEDICINE | Facility: CLINIC | Age: 67
End: 2022-12-29
Payer: COMMERCIAL

## 2022-12-29 VITALS
HEART RATE: 78 BPM | BODY MASS INDEX: 29.34 KG/M2 | RESPIRATION RATE: 18 BRPM | SYSTOLIC BLOOD PRESSURE: 122 MMHG | HEIGHT: 63 IN | DIASTOLIC BLOOD PRESSURE: 74 MMHG | WEIGHT: 165.56 LBS | OXYGEN SATURATION: 98 %

## 2022-12-29 DIAGNOSIS — G43.009 MIGRAINE WITHOUT AURA AND WITHOUT STATUS MIGRAINOSUS, NOT INTRACTABLE: Primary | ICD-10-CM

## 2022-12-29 PROCEDURE — 3008F PR BODY MASS INDEX (BMI) DOCUMENTED: ICD-10-PCS | Mod: CPTII,S$GLB,, | Performed by: STUDENT IN AN ORGANIZED HEALTH CARE EDUCATION/TRAINING PROGRAM

## 2022-12-29 PROCEDURE — 1159F PR MEDICATION LIST DOCUMENTED IN MEDICAL RECORD: ICD-10-PCS | Mod: CPTII,S$GLB,, | Performed by: STUDENT IN AN ORGANIZED HEALTH CARE EDUCATION/TRAINING PROGRAM

## 2022-12-29 PROCEDURE — 1160F PR REVIEW ALL MEDS BY PRESCRIBER/CLIN PHARMACIST DOCUMENTED: ICD-10-PCS | Mod: CPTII,S$GLB,, | Performed by: STUDENT IN AN ORGANIZED HEALTH CARE EDUCATION/TRAINING PROGRAM

## 2022-12-29 PROCEDURE — 99999 PR PBB SHADOW E&M-EST. PATIENT-LVL IV: CPT | Mod: PBBFAC,,, | Performed by: STUDENT IN AN ORGANIZED HEALTH CARE EDUCATION/TRAINING PROGRAM

## 2022-12-29 PROCEDURE — 1159F MED LIST DOCD IN RCRD: CPT | Mod: CPTII,S$GLB,, | Performed by: STUDENT IN AN ORGANIZED HEALTH CARE EDUCATION/TRAINING PROGRAM

## 2022-12-29 PROCEDURE — 1101F PR PT FALLS ASSESS DOC 0-1 FALLS W/OUT INJ PAST YR: ICD-10-PCS | Mod: CPTII,S$GLB,, | Performed by: STUDENT IN AN ORGANIZED HEALTH CARE EDUCATION/TRAINING PROGRAM

## 2022-12-29 PROCEDURE — 3288F PR FALLS RISK ASSESSMENT DOCUMENTED: ICD-10-PCS | Mod: CPTII,S$GLB,, | Performed by: STUDENT IN AN ORGANIZED HEALTH CARE EDUCATION/TRAINING PROGRAM

## 2022-12-29 PROCEDURE — 3074F PR MOST RECENT SYSTOLIC BLOOD PRESSURE < 130 MM HG: ICD-10-PCS | Mod: CPTII,S$GLB,, | Performed by: STUDENT IN AN ORGANIZED HEALTH CARE EDUCATION/TRAINING PROGRAM

## 2022-12-29 PROCEDURE — 99213 PR OFFICE/OUTPT VISIT, EST, LEVL III, 20-29 MIN: ICD-10-PCS | Mod: S$GLB,,, | Performed by: STUDENT IN AN ORGANIZED HEALTH CARE EDUCATION/TRAINING PROGRAM

## 2022-12-29 PROCEDURE — 3074F SYST BP LT 130 MM HG: CPT | Mod: CPTII,S$GLB,, | Performed by: STUDENT IN AN ORGANIZED HEALTH CARE EDUCATION/TRAINING PROGRAM

## 2022-12-29 PROCEDURE — 1126F PR PAIN SEVERITY QUANTIFIED, NO PAIN PRESENT: ICD-10-PCS | Mod: CPTII,S$GLB,, | Performed by: STUDENT IN AN ORGANIZED HEALTH CARE EDUCATION/TRAINING PROGRAM

## 2022-12-29 PROCEDURE — 3288F FALL RISK ASSESSMENT DOCD: CPT | Mod: CPTII,S$GLB,, | Performed by: STUDENT IN AN ORGANIZED HEALTH CARE EDUCATION/TRAINING PROGRAM

## 2022-12-29 PROCEDURE — 3078F PR MOST RECENT DIASTOLIC BLOOD PRESSURE < 80 MM HG: ICD-10-PCS | Mod: CPTII,S$GLB,, | Performed by: STUDENT IN AN ORGANIZED HEALTH CARE EDUCATION/TRAINING PROGRAM

## 2022-12-29 PROCEDURE — 1126F AMNT PAIN NOTED NONE PRSNT: CPT | Mod: CPTII,S$GLB,, | Performed by: STUDENT IN AN ORGANIZED HEALTH CARE EDUCATION/TRAINING PROGRAM

## 2022-12-29 PROCEDURE — 3078F DIAST BP <80 MM HG: CPT | Mod: CPTII,S$GLB,, | Performed by: STUDENT IN AN ORGANIZED HEALTH CARE EDUCATION/TRAINING PROGRAM

## 2022-12-29 PROCEDURE — 3008F BODY MASS INDEX DOCD: CPT | Mod: CPTII,S$GLB,, | Performed by: STUDENT IN AN ORGANIZED HEALTH CARE EDUCATION/TRAINING PROGRAM

## 2022-12-29 PROCEDURE — 1101F PT FALLS ASSESS-DOCD LE1/YR: CPT | Mod: CPTII,S$GLB,, | Performed by: STUDENT IN AN ORGANIZED HEALTH CARE EDUCATION/TRAINING PROGRAM

## 2022-12-29 PROCEDURE — 1160F RVW MEDS BY RX/DR IN RCRD: CPT | Mod: CPTII,S$GLB,, | Performed by: STUDENT IN AN ORGANIZED HEALTH CARE EDUCATION/TRAINING PROGRAM

## 2022-12-29 PROCEDURE — 99999 PR PBB SHADOW E&M-EST. PATIENT-LVL IV: ICD-10-PCS | Mod: PBBFAC,,, | Performed by: STUDENT IN AN ORGANIZED HEALTH CARE EDUCATION/TRAINING PROGRAM

## 2022-12-29 PROCEDURE — 99213 OFFICE O/P EST LOW 20 MIN: CPT | Mod: S$GLB,,, | Performed by: STUDENT IN AN ORGANIZED HEALTH CARE EDUCATION/TRAINING PROGRAM

## 2022-12-29 RX ORDER — FLUTICASONE PROPIONATE 50 MCG
SPRAY, SUSPENSION (ML) NASAL
COMMUNITY
Start: 2022-12-26

## 2022-12-29 RX ORDER — BUTALBITAL, ACETAMINOPHEN AND CAFFEINE 50; 325; 40 MG/1; MG/1; MG/1
1 TABLET ORAL EVERY 4 HOURS PRN
Qty: 15 TABLET | Refills: 1 | Status: SHIPPED | OUTPATIENT
Start: 2022-12-29 | End: 2022-12-29

## 2022-12-29 RX ORDER — FLUTICASONE PROPIONATE 50 MCG
SPRAY, SUSPENSION (ML) NASAL
COMMUNITY
End: 2022-12-29

## 2022-12-29 RX ORDER — BUTALBITAL, ACETAMINOPHEN AND CAFFEINE 50; 325; 40 MG/1; MG/1; MG/1
1 TABLET ORAL EVERY 4 HOURS PRN
Qty: 15 TABLET | Refills: 0 | Status: SHIPPED | OUTPATIENT
Start: 2022-12-29

## 2022-12-29 NOTE — PROGRESS NOTES
"Subjective:      Patient ID: Fabricio Sawyer is a 67 y.o. female    Chief Complaint   Patient presents with    Medication Problem     Would like to change the medication dosage for her migraines      HPI  67 y.o. female with a PMHx as documented below presents to clinic today for the following:    Patient reports history of migraines, currently on rizatriptan 10 mg p.r.n..  Patient states rizatriptan has not been adequately controlling her symptoms over the past 2 months (patient reports to severe migraines during this time where the rizatriptan did not work, requiring her to stay home from work due to severity of symptoms).  Patient reports history of similar changes and migraine headaches triggered by the weather.  Interested in any temporary medication adjustment to help with migraines during this time.    Past Medical History:   Diagnosis Date    Abnormal Pap smear     about 15 years ago coned and cryo normals ever since    Abnormal Pap smear of vagina     Breast disorder     implants one is slightly leaking    Colon polyps     last cscope 2010 clear    GERD (gastroesophageal reflux disease)     History of pneumonia     Hyperlipidemia     Hypothyroid 03/24/2014    Migraine     MVP (mitral valve prolapse)       Review of Systems   Constitutional:  Negative for chills and fever.   Respiratory:  Negative for shortness of breath.    Cardiovascular:  Negative for chest pain.   Gastrointestinal:  Negative for abdominal pain, constipation, diarrhea, nausea and vomiting.   Genitourinary:  Negative for dysuria.     Objective:      Vitals:    12/29/22 0955   BP: 122/74   BP Location: Left arm   Patient Position: Sitting   Pulse: 78   Resp: 18   SpO2: 98%   Weight: 75.1 kg (165 lb 9.1 oz)   Height: 5' 3" (1.6 m)     Physical Exam  Vitals reviewed.   Constitutional:       General: She is not in acute distress.  HENT:      Head: Normocephalic and atraumatic.   Cardiovascular:      Rate and Rhythm: Normal rate.   Pulmonary: "      Effort: Pulmonary effort is normal. No respiratory distress.   Neurological:      General: No focal deficit present.      Mental Status: She is alert and oriented to person, place, and time. Mental status is at baseline.      Assessment:       1. Migraine without aura and without status migrainosus, not intractable      Plan:       Fabricio was seen today for medication problem.    Diagnoses and all orders for this visit:    Migraine without aura and without status migrainosus, not intractable  -     butalbital-acetaminophen-caffeine -40 mg (FIORICET, ESGIC) -40 mg per tablet; Take 1 tablet by mouth every 4 (four) hours as needed for Pain or Headaches.    Continue rizatriptan as previously prescribed.  Fioricet prescribed for as needed use if her sumatriptan does not work.  May consider daily preventative medication in the future symptoms not well controlled.    Follow up if symptoms worsen or fail to improve.    Gladis Lee MD  [facility]  Office: [phone]   Fax: [phone]  12/29/2022      Disclaimer: This note was partly generated using dictation software which may occasionally result in transcription errors.

## 2023-06-07 DIAGNOSIS — Z12.31 OTHER SCREENING MAMMOGRAM: ICD-10-CM

## 2023-06-12 ENCOUNTER — PATIENT MESSAGE (OUTPATIENT)
Dept: ADMINISTRATIVE | Facility: HOSPITAL | Age: 68
End: 2023-06-12
Payer: COMMERCIAL

## 2023-07-11 ENCOUNTER — HOSPITAL ENCOUNTER (OUTPATIENT)
Dept: RADIOLOGY | Facility: HOSPITAL | Age: 68
Discharge: HOME | End: 2023-07-11
Attending: FAMILY MEDICINE
Payer: COMMERCIAL

## 2023-07-11 DIAGNOSIS — Z12.31 OTHER SCREENING MAMMOGRAM: ICD-10-CM

## 2023-07-11 PROCEDURE — 77063 BREAST TOMOSYNTHESIS BI: CPT | Mod: 26,,, | Performed by: RADIOLOGY

## 2023-07-11 PROCEDURE — 77067 SCR MAMMO BI INCL CAD: CPT | Mod: TC,PO

## 2023-07-11 PROCEDURE — 77063 MAMMO DIGITAL SCREENING BILAT WITH TOMO: ICD-10-PCS | Mod: 26,,, | Performed by: RADIOLOGY

## 2023-07-11 PROCEDURE — 77067 SCR MAMMO BI INCL CAD: CPT | Mod: 26,,, | Performed by: RADIOLOGY

## 2023-07-11 PROCEDURE — 77067 MAMMO DIGITAL SCREENING BILAT WITH TOMO: ICD-10-PCS | Mod: 26,,, | Performed by: RADIOLOGY

## 2023-08-21 ENCOUNTER — HOSPITAL ENCOUNTER (OUTPATIENT)
Dept: RADIOLOGY | Facility: HOSPITAL | Age: 68
Discharge: HOME | End: 2023-08-21
Attending: OBSTETRICS & GYNECOLOGY
Payer: COMMERCIAL

## 2023-08-21 DIAGNOSIS — N63.20 LEFT BREAST LUMP: ICD-10-CM

## 2023-08-21 PROCEDURE — 76642 ULTRASOUND BREAST LIMITED: CPT | Mod: TC,PO,LT

## 2023-08-21 PROCEDURE — 77061 BREAST TOMOSYNTHESIS UNI: CPT | Mod: 26,LT,, | Performed by: RADIOLOGY

## 2023-08-21 PROCEDURE — 77065 DX MAMMO INCL CAD UNI: CPT | Mod: 26,LT,, | Performed by: RADIOLOGY

## 2023-08-21 PROCEDURE — 76642 ULTRASOUND BREAST LIMITED: CPT | Mod: 26,LT,, | Performed by: RADIOLOGY

## 2023-08-21 PROCEDURE — 76642 US BREAST LEFT LIMITED: ICD-10-PCS | Mod: 26,LT,, | Performed by: RADIOLOGY

## 2023-08-21 PROCEDURE — 77061 BREAST TOMOSYNTHESIS UNI: CPT | Mod: TC,PO,LT

## 2023-08-21 PROCEDURE — 77061 MAMMO DIGITAL DIAGNOSTIC LEFT WITH TOMO: ICD-10-PCS | Mod: 26,LT,, | Performed by: RADIOLOGY

## 2023-08-21 PROCEDURE — 77065 MAMMO DIGITAL DIAGNOSTIC LEFT WITH TOMO: ICD-10-PCS | Mod: 26,LT,, | Performed by: RADIOLOGY

## 2023-08-30 ENCOUNTER — OFFICE VISIT (OUTPATIENT)
Dept: URGENT CARE | Facility: CLINIC | Age: 68
End: 2023-08-30
Payer: COMMERCIAL

## 2023-08-30 ENCOUNTER — TELEPHONE (OUTPATIENT)
Dept: FAMILY MEDICINE | Facility: CLINIC | Age: 68
End: 2023-08-30
Payer: COMMERCIAL

## 2023-08-30 VITALS
RESPIRATION RATE: 14 BRPM | HEIGHT: 63 IN | TEMPERATURE: 97 F | WEIGHT: 167 LBS | DIASTOLIC BLOOD PRESSURE: 84 MMHG | BODY MASS INDEX: 29.59 KG/M2 | SYSTOLIC BLOOD PRESSURE: 150 MMHG | HEART RATE: 109 BPM | OXYGEN SATURATION: 98 %

## 2023-08-30 DIAGNOSIS — R50.9 FEVER, UNSPECIFIED FEVER CAUSE: ICD-10-CM

## 2023-08-30 DIAGNOSIS — J32.9 SINUSITIS, UNSPECIFIED CHRONICITY, UNSPECIFIED LOCATION: Primary | ICD-10-CM

## 2023-08-30 LAB
CTP QC/QA: YES
SARS-COV-2 AG RESP QL IA.RAPID: NEGATIVE

## 2023-08-30 PROCEDURE — 87811 SARS-COV-2 COVID19 W/OPTIC: CPT | Mod: QW,S$GLB,, | Performed by: INTERNAL MEDICINE

## 2023-08-30 PROCEDURE — 99213 OFFICE O/P EST LOW 20 MIN: CPT | Mod: S$GLB,,, | Performed by: INTERNAL MEDICINE

## 2023-08-30 PROCEDURE — 87811 SARS CORONAVIRUS 2 ANTIGEN POCT, MANUAL READ: ICD-10-PCS | Mod: QW,S$GLB,, | Performed by: INTERNAL MEDICINE

## 2023-08-30 PROCEDURE — 99213 PR OFFICE/OUTPT VISIT, EST, LEVL III, 20-29 MIN: ICD-10-PCS | Mod: S$GLB,,, | Performed by: INTERNAL MEDICINE

## 2023-08-30 RX ORDER — PROMETHAZINE HYDROCHLORIDE AND DEXTROMETHORPHAN HYDROBROMIDE 6.25; 15 MG/5ML; MG/5ML
5 SYRUP ORAL 3 TIMES DAILY
Qty: 105 ML | Refills: 0 | Status: SHIPPED | OUTPATIENT
Start: 2023-08-30 | End: 2023-09-06

## 2023-08-30 RX ORDER — DOXYCYCLINE 100 MG/1
100 CAPSULE ORAL 2 TIMES DAILY
Qty: 14 CAPSULE | Refills: 0 | Status: SHIPPED | OUTPATIENT
Start: 2023-08-30 | End: 2023-09-06

## 2023-08-30 NOTE — PROGRESS NOTES
"Subjective:      Patient ID: Fabricio Sawyer is a 68 y.o. female.    Vitals:  height is 5' 3" (1.6 m) and weight is 75.8 kg (167 lb). Her temporal temperature is 97.1 °F (36.2 °C). Her blood pressure is 150/84 (abnormal) and her pulse is 109. Her respiration is 14 and oxygen saturation is 98%.     Chief Complaint: Fever    Fever, fatigue, chills that started earlier this afternoon while at work  Patient has had a dry cough for approximately 24 hours   Patient states she also has right hip pain that started 1 day ago   Patient has not tried any OTC medications for relief    Other  This is a new problem. The current episode started yesterday. The problem occurs constantly. The problem has been gradually worsening. Associated symptoms include chills, coughing, fatigue and a fever. She has tried nothing for the symptoms.       Constitution: Positive for chills, fatigue and fever.   Respiratory:  Positive for cough.       Objective:     Physical Exam   Constitutional: She is oriented to person, place, and time. She appears well-developed. She is cooperative.  Non-toxic appearance. She does not appear ill. No distress.   HENT:   Head: Normocephalic and atraumatic.   Ears:   Right Ear: Hearing, tympanic membrane, external ear and ear canal normal.   Left Ear: Hearing, tympanic membrane, external ear and ear canal normal.   Nose: Rhinorrhea and congestion present. No mucosal edema or nasal deformity. No epistaxis. Right sinus exhibits no maxillary sinus tenderness and no frontal sinus tenderness. Left sinus exhibits no maxillary sinus tenderness and no frontal sinus tenderness.   Mouth/Throat: Uvula is midline and mucous membranes are normal. No trismus in the jaw. Normal dentition. No uvula swelling. Posterior oropharyngeal erythema present. No oropharyngeal exudate or posterior oropharyngeal edema.   Eyes: Conjunctivae and lids are normal. No scleral icterus.   Neck: Trachea normal and phonation normal. Neck supple. No " edema present. No erythema present. No neck rigidity present.   Cardiovascular: Normal rate, regular rhythm, normal heart sounds and normal pulses.   Pulmonary/Chest: Effort normal and breath sounds normal. No respiratory distress. She has no decreased breath sounds. She has no rhonchi.   Abdominal: Normal appearance.   Musculoskeletal: Normal range of motion.         General: No deformity. Normal range of motion.   Neurological: She is alert and oriented to person, place, and time. She exhibits normal muscle tone. Coordination normal.   Skin: Skin is warm, dry, intact, not diaphoretic and not pale.   Psychiatric: Her speech is normal and behavior is normal. Judgment and thought content normal.   Nursing note and vitals reviewed.      Assessment:     1. Sinusitis, unspecified chronicity, unspecified location    2. Fever, unspecified fever cause        Plan:       Sinusitis, unspecified chronicity, unspecified location  -     doxycycline (VIBRAMYCIN) 100 MG Cap; Take 1 capsule (100 mg total) by mouth 2 (two) times daily. for 7 days  Dispense: 14 capsule; Refill: 0  -     promethazine-dextromethorphan (PROMETHAZINE-DM) 6.25-15 mg/5 mL Syrp; Take 5 mLs by mouth 3 (three) times daily. for 7 days  Dispense: 105 mL; Refill: 0    Fever, unspecified fever cause  -     SARS Coronavirus 2 Antigen, POCT Manual Read      Patient Instructions   If your condition worsens we recommend that you receive another evaluation at the emergency room immediately or contact your primary medical clinics after hours call service to discuss your concerns. You must understand that you've received an Urgent Care treatment only and that you may be released before all of your medical problems are known or treated. You, the patient, will arrange for follow up care as instructed.  Drink plenty of Fluids  Wash hands frequently using mild antibacterial soap lathering for at least 15 seconds then rinse  Get plenty of Rest  Salt water gargles  Follow up  in 1-2 weeks with Primary Care physician if not significantly better.   If you are not allergic please Tylenol every 4-6 hours as needed and/or Ibuprofen every 6-8 hours as needed, over the counter for pain or fever.  Take OTC Cough/Congestion medicine as needed. Talk to your pharmacist about the best option for you.     My notes were dictated with [employer]. Any misspellings or nonsensical grammar should be attributed to its use and allowances made for errors and typographic syntactical error(s).

## 2023-08-30 NOTE — TELEPHONE ENCOUNTER
----- Message from Evangelina Tate sent at 8/30/2023  3:40 PM CDT -----  Contact: pt [phone]  Type:  Sooner Appointment Request    Caller is requesting a sooner appointment.  Caller declined first available appointment listed below.  Caller will not accept being placed on the waitlist and is requesting a message be sent to doctor.    Name of Caller:  Pt   When is the first available appointment?  NA   Symptoms:  Cough / fever  Best Call Back Number:  [phone]    Additional Information:  Pt requesting to be seen tomorrow       Try taking the magnesium at bedtime  Continue with physical therapy  Osteopathic manual medicine today

## 2023-08-30 NOTE — TELEPHONE ENCOUNTER
Spoke w/ pt. VV sched for tomorrow w/ Dr Ellis. Advised pt that if she prefers to be seen this evening, she can go to our UC clinic. Pt would like to do that. VV tomorrow cx'd. Pt will f/u PRN

## 2023-09-28 RX ORDER — BUPROPION HYDROCHLORIDE 150 MG/1
150 TABLET ORAL
Qty: 90 TABLET | Refills: 3 | Status: SHIPPED | OUTPATIENT
Start: 2023-09-28

## 2023-09-28 NOTE — TELEPHONE ENCOUNTER
Care Due:                  Date            Visit Type   Department     Provider  --------------------------------------------------------------------------------                                EP -                              PRIMARY      [facility]  Last Visit: 12-      CARE ([facility])   MEDICINE       Gladis Lee  Next Visit: None Scheduled  None         None Found                                                            Last  Test          Frequency    Reason                     Performed    Due Date  --------------------------------------------------------------------------------    Office Visit  12 months..  ARMOUR, buPROPion,         12- 12-                             rizatriptan..............    TSH.........  12 months..  ANGI...................  Not Found    Overdue    [employer] Embedded Care Due Messages. Reference number: [id number].   9/28/2023 8:05:48 AM CDT

## 2024-04-01 RX ORDER — RIZATRIPTAN BENZOATE 10 MG/1
TABLET, ORALLY DISINTEGRATING ORAL
Qty: 9 TABLET | Refills: 11 | OUTPATIENT
Start: 2024-04-01

## 2024-04-01 NOTE — TELEPHONE ENCOUNTER
Care Due:                  Date            Visit Type   Department     Provider  --------------------------------------------------------------------------------                                EP -                              PRIMARY      [facility] FAM[facility]  Last Visit: 12-      CARE ([facility])   MEDICINE       Gladis Lee  Next Visit: None Scheduled  None         None Found                                                            Last  Test          Frequency    Reason                     Performed    Due Date  --------------------------------------------------------------------------------    Office Visit  15 months..  buPROPion, rizatriptan...  12- 03-    [facility] Embedded Care Due Messages. Reference number: [id number].   4/01/2024 9:50:20 AM CDT

## 2024-04-01 NOTE — TELEPHONE ENCOUNTER
Pt needs appointment haven't been seen since 2022 sent pt a portal message to schedule appointment.

## 2024-07-15 ENCOUNTER — HOSPITAL ENCOUNTER (OUTPATIENT)
Dept: RADIOLOGY | Facility: HOSPITAL | Age: 69
Discharge: HOME | End: 2024-07-15
Attending: OBSTETRICS & GYNECOLOGY
Payer: COMMERCIAL

## 2024-07-15 DIAGNOSIS — N63.20 MASS OF LEFT BREAST: ICD-10-CM

## 2024-07-15 PROCEDURE — 77066 DX MAMMO INCL CAD BI: CPT | Mod: 26,,, | Performed by: RADIOLOGY

## 2024-07-15 PROCEDURE — 77062 BREAST TOMOSYNTHESIS BI: CPT | Mod: TC,PO

## 2024-07-15 PROCEDURE — 76642 ULTRASOUND BREAST LIMITED: CPT | Mod: 26,LT,, | Performed by: RADIOLOGY

## 2024-07-15 PROCEDURE — 76642 ULTRASOUND BREAST LIMITED: CPT | Mod: TC,PO,LT

## 2024-07-15 PROCEDURE — 77062 BREAST TOMOSYNTHESIS BI: CPT | Mod: 26,,, | Performed by: RADIOLOGY

## 2024-08-31 ENCOUNTER — OFFICE VISIT (OUTPATIENT)
Dept: URGENT CARE | Facility: CLINIC | Age: 69
End: 2024-08-31
Payer: COMMERCIAL

## 2024-08-31 VITALS
WEIGHT: 167.13 LBS | RESPIRATION RATE: 18 BRPM | HEIGHT: 63 IN | TEMPERATURE: 100 F | SYSTOLIC BLOOD PRESSURE: 123 MMHG | DIASTOLIC BLOOD PRESSURE: 72 MMHG | HEART RATE: 108 BPM | OXYGEN SATURATION: 97 % | BODY MASS INDEX: 29.61 KG/M2

## 2024-08-31 DIAGNOSIS — U07.1 COVID-19 VIRUS INFECTION: Primary | ICD-10-CM

## 2024-08-31 DIAGNOSIS — R50.9 FEVER, UNSPECIFIED FEVER CAUSE: ICD-10-CM

## 2024-08-31 DIAGNOSIS — R05.9 COUGH, UNSPECIFIED TYPE: ICD-10-CM

## 2024-08-31 PROCEDURE — 99213 OFFICE O/P EST LOW 20 MIN: CPT | Mod: S$GLB,,, | Performed by: PHYSICIAN ASSISTANT

## 2024-08-31 RX ORDER — ROSUVASTATIN CALCIUM 10 MG/1
1 TABLET, COATED ORAL DAILY
COMMUNITY
Start: 2024-05-07

## 2024-08-31 RX ORDER — ONDANSETRON 4 MG/1
TABLET, FILM COATED ORAL
COMMUNITY

## 2024-08-31 RX ORDER — METOPROLOL SUCCINATE 25 MG/1
1 TABLET, EXTENDED RELEASE ORAL DAILY
COMMUNITY
Start: 2024-04-25

## 2024-08-31 RX ORDER — CYCLOBENZAPRINE HCL 5 MG
TABLET ORAL
COMMUNITY

## 2024-08-31 NOTE — PROGRESS NOTES
"Subjective:      Patient ID: Fabricio Sawyer is a 69 y.o. female.    Vitals:  height is 5' 3" (1.6 m) and weight is 75.8 kg (167 lb 1.7 oz). Her oral temperature is 100.3 °F (37.9 °C). Her blood pressure is 123/72 and her pulse is 108. Her respiration is 18 and oxygen saturation is 97%.     Chief Complaint: No chief complaint on file.    Patient is here for Paxlovid RX. Patient reports at-home positive COVID-19. Patient is a .    Cough  This is a new problem. The current episode started in the past 7 days. The problem has been gradually worsening. The cough is Non-productive. Associated symptoms include a fever, headaches, nasal congestion, postnasal drip, rhinorrhea and a sore throat. Pertinent negatives include no chest pain, chills, ear congestion, ear pain, eye redness, heartburn, hemoptysis, myalgias, rash, shortness of breath, sweats, weight loss or wheezing. Nothing aggravates the symptoms. Treatments tried: Tylenol. The treatment provided mild relief.       Constitution: Positive for fever. Negative for chills, sweating and fatigue.   HENT:  Positive for congestion, postnasal drip, sinus pressure and sore throat. Negative for ear pain, drooling, nosebleeds, foreign body in nose, sinus pain, trouble swallowing and voice change.    Neck: Negative for neck pain, neck stiffness, painful lymph nodes and neck swelling.   Cardiovascular:  Negative for chest pain, leg swelling, palpitations, sob on exertion and passing out.   Eyes:  Negative for eye pain, eye redness, photophobia, double vision, blurred vision and eyelid swelling.   Respiratory:  Positive for cough. Negative for chest tightness, sputum production, bloody sputum, shortness of breath, stridor and wheezing.    Gastrointestinal:  Negative for abdominal pain, abdominal bloating, nausea, vomiting, constipation, diarrhea and heartburn.   Genitourinary:  Negative for dysuria, frequency, urgency, flank pain and hematuria. "   Musculoskeletal:  Negative for joint pain, joint swelling, abnormal ROM of joint, back pain, muscle cramps and muscle ache.   Skin:  Negative for rash and hives.   Allergic/Immunologic: Negative for seasonal allergies, food allergies, hives, itching and sneezing.   Neurological:  Positive for headaches. Negative for dizziness, light-headedness, passing out, facial drooping, speech difficulty, loss of balance, altered mental status, loss of consciousness and seizures.   Hematologic/Lymphatic: Negative for swollen lymph nodes.   Psychiatric/Behavioral:  Negative for altered mental status and nervous/anxious. The patient is not nervous/anxious.       Objective:     Physical Exam   Constitutional: She is oriented to person, place, and time. She appears well-developed. She is cooperative.  Non-toxic appearance. She does not appear ill. No distress.   HENT:   Head: Normocephalic and atraumatic.   Ears:   Right Ear: Hearing, tympanic membrane, external ear and ear canal normal.   Left Ear: Hearing, tympanic membrane, external ear and ear canal normal.   Nose: Mucosal edema and rhinorrhea present. No nasal deformity. No epistaxis. Right sinus exhibits no maxillary sinus tenderness and no frontal sinus tenderness. Left sinus exhibits no maxillary sinus tenderness and no frontal sinus tenderness.   Mouth/Throat: Uvula is midline and mucous membranes are normal. No trismus in the jaw. Normal dentition. No uvula swelling. Posterior oropharyngeal erythema and cobblestoning present. No oropharyngeal exudate or posterior oropharyngeal edema.   Eyes: Conjunctivae and lids are normal. No scleral icterus.   Neck: Trachea normal and phonation normal. Neck supple. No edema present. No erythema present. No neck rigidity present.   Cardiovascular: Normal rate, regular rhythm, normal heart sounds and normal pulses.   Pulmonary/Chest: Effort normal and breath sounds normal. No accessory muscle usage or stridor. No respiratory distress.  She has no decreased breath sounds. She has no wheezes. She has no rhonchi. She has no rales.   Abdominal: Normal appearance.   Musculoskeletal: Normal range of motion.         General: No deformity or edema. Normal range of motion.   Lymphadenopathy:     She has no cervical adenopathy.   Neurological: She is alert and oriented to person, place, and time. She exhibits normal muscle tone. Coordination normal.   Skin: Skin is warm, dry, intact, not diaphoretic, not pale and no rash. Capillary refill takes less than 2 seconds.   Psychiatric: Her speech is normal and behavior is normal. Judgment and thought content normal.   Nursing note and vitals reviewed.    AT-HOME COVID-19 POSITIVE TEST    Assessment:     1. COVID-19 virus infection    2. Fever, unspecified fever cause    3. Cough, unspecified type      Plan:   Discussed AT-HOME COVID-19 results with patient. [facility] precautions given to patient. Discussed treatment options, possible SE and DI and patient prefers to go ahead with Antiviral RX at this time. Discussed with patient to D/C Statin while taking Paxlovid RX. After Paxlovid RX, patient can take Statin again. Advised close follow-up with PCP and/or Specialist for further evaluation as needed. ER precautions given to patient as well. Patient aware, verbalized understanding and agreed with plan of care.    COVID-19 virus infection    Fever, unspecified fever cause    Cough, unspecified type    Other orders  -     nirmatrelvir-ritonavir 300 mg (150 mg x 2)-100 mg copackaged tablets (EUA); Take 3 tablets by mouth 2 (two) times daily for 5 days. Each dose contains 2 nirmatrelvir (pink tablets) and 1 ritonavir (white tablet). Take all 3 tablets together  Dispense: 30 tablet; Refill: 0    There are no Patient Instructions on file for this visit.

## 2024-10-28 RX ORDER — BUPROPION HYDROCHLORIDE 150 MG/1
150 TABLET ORAL
Qty: 90 TABLET | Refills: 3 | Status: SHIPPED | OUTPATIENT
Start: 2024-10-28

## 2024-10-31 ENCOUNTER — OFFICE VISIT (OUTPATIENT)
Dept: URGENT CARE | Facility: CLINIC | Age: 69
End: 2024-10-31
Payer: COMMERCIAL

## 2024-10-31 VITALS
WEIGHT: 131 LBS | BODY MASS INDEX: 23.21 KG/M2 | HEIGHT: 63 IN | RESPIRATION RATE: 17 BRPM | TEMPERATURE: 98 F | SYSTOLIC BLOOD PRESSURE: 112 MMHG | DIASTOLIC BLOOD PRESSURE: 74 MMHG | OXYGEN SATURATION: 99 % | HEART RATE: 72 BPM

## 2024-10-31 DIAGNOSIS — J20.8 ACUTE BACTERIAL BRONCHITIS: Primary | ICD-10-CM

## 2024-10-31 DIAGNOSIS — H65.193 ACUTE EFFUSION OF BOTH MIDDLE EARS: ICD-10-CM

## 2024-10-31 DIAGNOSIS — B96.89 ACUTE BACTERIAL BRONCHITIS: Primary | ICD-10-CM

## 2024-10-31 PROBLEM — E78.5 DYSLIPIDEMIA: Status: ACTIVE | Noted: 2023-07-26

## 2024-10-31 PROBLEM — F32.A DEPRESSIVE DISORDER: Status: ACTIVE | Noted: 2023-10-09

## 2024-10-31 PROBLEM — I10 HYPERTENSIVE DISORDER: Status: ACTIVE | Noted: 2024-01-09

## 2024-10-31 PROBLEM — G43.909 MIGRAINE: Status: ACTIVE | Noted: 2024-01-09

## 2024-10-31 LAB
CTP QC/QA: YES
MOLECULAR STREP A: NEGATIVE

## 2024-10-31 PROCEDURE — 99213 OFFICE O/P EST LOW 20 MIN: CPT | Mod: S$GLB,,, | Performed by: PHYSICIAN ASSISTANT

## 2024-10-31 PROCEDURE — 87651 STREP A DNA AMP PROBE: CPT | Mod: QW,S$GLB,, | Performed by: PHYSICIAN ASSISTANT

## 2024-10-31 RX ORDER — BENZONATATE 200 MG/1
200 CAPSULE ORAL 3 TIMES DAILY PRN
Qty: 30 CAPSULE | Refills: 0 | Status: SHIPPED | OUTPATIENT
Start: 2024-10-31 | End: 2024-11-10

## 2024-10-31 RX ORDER — FLUTICASONE PROPIONATE 50 MCG
1 SPRAY, SUSPENSION (ML) NASAL DAILY
Qty: 16 G | Refills: 0 | Status: SHIPPED | OUTPATIENT
Start: 2024-10-31 | End: 2024-11-07

## 2024-10-31 RX ORDER — DOXYCYCLINE HYCLATE 100 MG
100 TABLET ORAL 2 TIMES DAILY
Qty: 14 TABLET | Refills: 0 | Status: SHIPPED | OUTPATIENT
Start: 2024-10-31 | End: 2024-11-07

## 2025-08-11 DIAGNOSIS — Z12.31 ENCOUNTER FOR SCREENING MAMMOGRAM FOR MALIGNANT NEOPLASM OF BREAST: Primary | ICD-10-CM
